# Patient Record
Sex: FEMALE | Race: WHITE | Employment: PART TIME | URBAN - METROPOLITAN AREA
[De-identification: names, ages, dates, MRNs, and addresses within clinical notes are randomized per-mention and may not be internally consistent; named-entity substitution may affect disease eponyms.]

---

## 2017-01-04 ENCOUNTER — OFFICE VISIT (OUTPATIENT)
Dept: FAMILY MEDICINE CLINIC | Age: 47
End: 2017-01-04

## 2017-01-04 VITALS
TEMPERATURE: 98.3 F | HEART RATE: 73 BPM | RESPIRATION RATE: 12 BRPM | HEIGHT: 66 IN | DIASTOLIC BLOOD PRESSURE: 86 MMHG | SYSTOLIC BLOOD PRESSURE: 126 MMHG | WEIGHT: 196 LBS | OXYGEN SATURATION: 98 % | BODY MASS INDEX: 31.5 KG/M2

## 2017-01-04 DIAGNOSIS — R05.9 COUGH: Primary | ICD-10-CM

## 2017-01-04 RX ORDER — MONTELUKAST SODIUM 10 MG/1
10 TABLET ORAL DAILY
Qty: 30 TAB | Refills: 3 | Status: SHIPPED | OUTPATIENT
Start: 2017-01-04 | End: 2017-06-05 | Stop reason: ALTCHOICE

## 2017-01-04 RX ORDER — HYDROCODONE POLISTIREX AND CHLORPHENIRAMINE POLISTIREX 10; 8 MG/5ML; MG/5ML
1 SUSPENSION, EXTENDED RELEASE ORAL
Qty: 120 ML | Refills: 0 | Status: SHIPPED | OUTPATIENT
Start: 2017-01-04 | End: 2017-03-06 | Stop reason: ALTCHOICE

## 2017-01-04 NOTE — PROGRESS NOTES
HISTORY OF PRESENT ILLNESS  Daxa Mancuso is a 55 y.o. female. HPI  Patient is here today for follow up on: Cough    Cough: developed a cough 2 weeks ago. Was given a z pack. She has continued to have this cough; cough is not really productive; No fever; no notable postnasal drip; no tobacco use. tussionex did seem to help. PMH,  Meds, Allergies, Family History, Social history reviewed        Current Outpatient Prescriptions:     montelukast (SINGULAIR) 10 mg tablet, Take 1 Tab by mouth daily. , Disp: 30 Tab, Rfl: 3    chlorpheniramine-HYDROcodone (TUSSIONEX) 10-8 mg/5 mL suspension, Take 5 mL by mouth every twelve (12) hours as needed for Cough. Max Daily Amount: 10 mL., Disp: 120 mL, Rfl: 0    zolpidem CR (AMBIEN CR) 6.25 mg tablet, Take 1 Tab by mouth nightly as needed for Sleep., Disp: 30 Tab, Rfl: 3    nortriptyline (PAMELOR) 25 mg capsule, Take 1 Cap by mouth nightly., Disp: 30 Cap, Rfl: 6    buPROPion XL (WELLBUTRIN XL) 300 mg XL tablet, Take 1 Tab by mouth every morning., Disp: 30 Tab, Rfl: 6    labetalol (NORMODYNE) 100 mg tablet, Take 1 Tab by mouth daily. , Disp: 90 Tab, Rfl: 3    FOLIC ACID PO, Take  by mouth., Disp: , Rfl:     fluticasone (FLONASE) 50 mcg/actuation nasal spray, 2 Sprays by Both Nostrils route daily. , Disp: 1 Bottle, Rfl: 3    ETANERCEPT (ENBREL SC), by SubCUTAneous route., Disp: , Rfl:     multivitamin (ONE A DAY) tablet, Take 1 Tab by mouth daily. , Disp: , Rfl:     METHOTREXATE, by Does Not Apply route. Once weekly, Disp: , Rfl:     hydroxychloroquine (PLAQUENIL) 200 mg tablet, Take 200 mg by mouth two (2) times a day., Disp: , Rfl:     butalbital-acetaminophen-caffeine (FIORICET) -40 mg per tablet, Take 1 Tab by mouth., Disp: , Rfl:        PMH,  Meds, Allergies, Family History, Social history reviewed      Review of Systems   Constitutional: Negative for fever. HENT: Positive for ear pain (some, on ocassion).         Physical Exam   Constitutional: She appears well-developed and well-nourished. No distress. HENT:   Right Ear: Tympanic membrane normal.   Left Ear: Tympanic membrane normal.   Nose: Mucosal edema present. Right sinus exhibits no maxillary sinus tenderness and no frontal sinus tenderness. Left sinus exhibits no maxillary sinus tenderness and no frontal sinus tenderness. Mouth/Throat: No oropharyngeal exudate, posterior oropharyngeal edema or posterior oropharyngeal erythema. Cardiovascular: Normal rate and regular rhythm. Exam reveals no gallop and no friction rub. No murmur heard. Pulmonary/Chest: Breath sounds normal. No respiratory distress. She has no wheezes. She has no rales. Nursing note and vitals reviewed. Visit Vitals    /86 (BP 1 Location: Left arm, BP Patient Position: Sitting)    Pulse 73    Temp 98.3 °F (36.8 °C) (Oral)    Resp 12    Ht 5' 6\" (1.676 m)    Wt 196 lb (88.9 kg)    LMP 12/16/2016 (Exact Date)    SpO2 98%    BMI 31.64 kg/m2         ASSESSMENT and PLAN    ICD-10-CM ICD-9-CM    1. Cough R05 786.2        As above, residual;  Pt advised that cough could take several weeks to clear; She does not appear to be infected at this time   treatment plan as listed below  Orders Placed This Encounter    montelukast (SINGULAIR) 10 mg tablet    chlorpheniramine-HYDROcodone (TUSSIONEX) 10-8 mg/5 mL suspension     Follow-up Disposition:  Return if symptoms worsen or fail to improve. An After Visit Summary was printed and given to the patient. This has been fully explained to the patient, who indicates understanding.

## 2017-01-04 NOTE — MR AVS SNAPSHOT
Visit Information Date & Time Provider Department Dept. Phone Encounter #  
 1/4/2017 11:20 AM Nazario Myers, [address] [id number] Your Appointments 3/6/2017 10:40 AM  
ROUTINE CARE with Nazario Myers MD  
[facility] ([facility]) Appt Note: 3m fu wellbutrin [address] [address] 2 Omar Alderson [address] [address] Upcoming Health Maintenance Date Due  
 PAP AKA CERVICAL CYTOLOGY 12/6/2019 DTaP/Tdap/Td series (2 - Td) 4/1/2022 Allergies as of 1/4/2017  Review Complete On: 1/4/2017 By: Debra Lucio LPN No Known Allergies Current Immunizations  Never Reviewed Name Date Influenza Vaccine 9/22/2016, 10/23/2013 Influenza Vaccine (Quad) PF 9/14/2015 Tdap 4/1/2012 Varicella Virus Vaccine 9/12/2014 Not reviewed this visit You Were Diagnosed With   
  
 Codes Comments Cough    -  Primary ICD-10-CM: U15 ICD-9-CM: 957. 2 Vitals BP Pulse Temp Resp Height(growth percentile) Weight(growth percentile) 126/86 (BP 1 Location: Left arm, BP Patient Position: Sitting) 73 98.3 °F (36.8 °C) (Oral) 12 5' 6\" (1.676 m) 196 lb (88.9 kg) LMP SpO2 BMI OB Status Smoking Status 12/16/2016 (Exact Date) 98% 31.64 kg/m2 Having regular periods Never Smoker BMI and BSA Data Body Mass Index Body Surface Area  
 31.64 kg/m 2 2.03 m 2 Preferred Pharmacy Pharmacy Name Phone [address] [facility] [phone] Your Updated Medication List  
  
   
This list is accurate as of: 1/4/17 12:38 PM.  Always use your most recent med list.  
  
  
  
  
 buPROPion  mg XL tablet Commonly known as:  Alon Fair Take 1 Tab by mouth every morning. chlorpheniramine-HYDROcodone 10-8 mg/5 mL suspension Commonly known as:  Misty Oro Take 5 mL by mouth every twelve (12) hours as needed for Cough. Max Daily Amount: 10 mL. ENBREL SC  
by SubCUTAneous route. FIORICET -40 mg per tablet Generic drug:  butalbital-acetaminophen-caffeine Take 1 Tab by mouth. fluticasone 50 mcg/actuation nasal spray Commonly known as:  Humberto Guild 2 Sprays by Both Nostrils route daily. FOLIC ACID PO Take  by mouth. labetalol 100 mg tablet Commonly known as:  Angela Runner Take 1 Tab by mouth daily. METHOTREXATE  
by Does Not Apply route. Once weekly  
  
 montelukast 10 mg tablet Commonly known as:  SINGULAIR Take 1 Tab by mouth daily. multivitamin tablet Commonly known as:  ONE A DAY Take 1 Tab by mouth daily. nortriptyline 25 mg capsule Commonly known as:  PAMELOR Take 1 Cap by mouth nightly. PLAQUENIL 200 mg tablet Generic drug:  hydroxychloroquine Take 200 mg by mouth two (2) times a day. zolpidem CR 6.25 mg tablet Commonly known as:  AMBIEN CR Take 1 Tab by mouth nightly as needed for Sleep. Prescriptions Printed Refills  
 chlorpheniramine-HYDROcodone (TUSSIONEX) 10-8 mg/5 mL suspension 0 Sig: Take 5 mL by mouth every twelve (12) hours as needed for Cough. Max Daily Amount: 10 mL. Class: Print Route: Oral  
  
Prescriptions Sent to Pharmacy Refills  
 montelukast (SINGULAIR) 10 mg tablet 3 Sig: Take 1 Tab by mouth daily. Class: Normal  
 Pharmacy: [facility] [address] [place] [address] #: [phone] Route: Oral  
  
Patient Instructions Cough: Care Instructions Your Care Instructions A cough is your body's response to something that bothers your throat or airways. Many things can cause a cough. You might cough because of a cold or the flu, bronchitis, or asthma. Smoking, postnasal drip, allergies, and stomach acid that backs up into your throat also can cause coughs. A cough is a symptom, not a disease. Most coughs stop when the cause, such as a cold, goes away. You can take a few steps at home to cough less and feel better. Follow-up care is a key part of your treatment and safety. Be sure to make and go to all appointments, and call your doctor if you are having problems. It's also a good idea to know your test results and keep a list of the medicines you take. How can you care for yourself at home? · Drink lots of water and other fluids. This helps thin the mucus and soothes a dry or sore throat. Honey or lemon juice in hot water or tea may ease a dry cough. · Take cough medicine as directed by your doctor. · Prop up your head on pillows to help you breathe and ease a dry cough. · Try cough drops to soothe a dry or sore throat. Cough drops don't stop a cough. Medicine-flavored cough drops are no better than candy-flavored drops or hard candy. · Do not smoke. Avoid secondhand smoke. If you need help quitting, talk to your doctor about stop-smoking programs and medicines. These can increase your chances of quitting for good. When should you call for help? Call 911 anytime you think you may need emergency care. For example, call if: 
· You have severe trouble breathing. Call your doctor now or seek immediate medical care if: 
· You cough up blood. · You have new or worse trouble breathing. · You have a new or higher fever. · You have a new rash. Watch closely for changes in your health, and be sure to contact your doctor if: 
· You cough more deeply or more often, especially if you notice more mucus or a change in the color of your mucus. · You have new symptoms, such as a sore throat, an earache, or sinus pain. · You do not get better as expected. Where can you learn more? Go to http://paco-saturnino.info/. Enter D279 in the search box to learn more about \"Cough: Care Instructions. \" Current as of: May 27, 2016 Content Version: 11.1 © 4774-5949 [employer]. Care instructions adapted under license by [employer] (which disclaims liability or warranty for this information). If you have questions about a medical condition or this instruction, always ask your healthcare professional. [address] any warranty or liability for your use of this information. Introducing [facility] & HE[facility] SERVICES! [facility] introduces [employer] patient portal. Now you can access parts of your medical record, email your doctor's office, and request medication refills online. 1. In your internet browser, go to [email or web address] 2. Click on the First Time User? Click Here link in the Sign In box. You will see the New Member Sign Up page. 3. Enter your [employer] Access Code exactly as it appears below. You will not need to use this code after youve completed the sign-up process. If you do not sign up before the expiration date, you must request a new code. · [employer] Access Code: [email or web address] Expires: 3/10/2017 10:24 PM 
 
4. Enter the last four digits of your Social Security Number (xxxx) and Date of Birth (mm/dd/yyyy) as indicated and click Submit. You will be taken to the next sign-up page. 5. Create a [employer] ID. This will be your [employer] login ID and cannot be changed, so think of one that is secure and easy to remember. 6. Create a [employer] password. You can change your password at any time. 7. Enter your Password Reset Question and Answer. This can be used at a later time if you forget your password. 8. Enter your e-mail address. You will receive e-mail notification when new information is available in [address] 9. Click Sign Up. You can now view and download portions of your medical record. 10. Click the Download Summary menu link to download a portable copy of your medical information.  
 
If you have questions, please visit the Frequently Asked Questions section of the Patient Access Solutions. Remember, [employer] is NOT to be used for urgent needs. For medical emergencies, dial 911. Now available from your iPhone and Android! Please provide this summary of care documentation to your next provider. Your primary care clinician is listed as [address]. If you have any questions after today's visit, please call [phone].

## 2017-01-04 NOTE — PROGRESS NOTES
1. Have you been to the ER, urgent care clinic since your last visit? Hospitalized since your last visit? No    2. Have you seen or consulted any other health care providers outside of the [address] [place] [address] since your last visit? Include any pap smears or colon screening.  No

## 2017-01-04 NOTE — PATIENT INSTRUCTIONS
Cough: Care Instructions  Your Care Instructions  A cough is your body's response to something that bothers your throat or airways. Many things can cause a cough. You might cough because of a cold or the flu, bronchitis, or asthma. Smoking, postnasal drip, allergies, and stomach acid that backs up into your throat also can cause coughs. A cough is a symptom, not a disease. Most coughs stop when the cause, such as a cold, goes away. You can take a few steps at home to cough less and feel better. Follow-up care is a key part of your treatment and safety. Be sure to make and go to all appointments, and call your doctor if you are having problems. It's also a good idea to know your test results and keep a list of the medicines you take. How can you care for yourself at home? · Drink lots of water and other fluids. This helps thin the mucus and soothes a dry or sore throat. Honey or lemon juice in hot water or tea may ease a dry cough. · Take cough medicine as directed by your doctor. · Prop up your head on pillows to help you breathe and ease a dry cough. · Try cough drops to soothe a dry or sore throat. Cough drops don't stop a cough. Medicine-flavored cough drops are no better than candy-flavored drops or hard candy. · Do not smoke. Avoid secondhand smoke. If you need help quitting, talk to your doctor about stop-smoking programs and medicines. These can increase your chances of quitting for good. When should you call for help? Call 911 anytime you think you may need emergency care. For example, call if:  · You have severe trouble breathing. Call your doctor now or seek immediate medical care if:  · You cough up blood. · You have new or worse trouble breathing. · You have a new or higher fever. · You have a new rash.   Watch closely for changes in your health, and be sure to contact your doctor if:  · You cough more deeply or more often, especially if you notice more mucus or a change in the color of your mucus. · You have new symptoms, such as a sore throat, an earache, or sinus pain. · You do not get better as expected. Where can you learn more? Go to http://paco-saturnino.info/. Enter D279 in the search box to learn more about \"Cough: Care Instructions. \"  Current as of: May 27, 2016  Content Version: 11.1  © 9468-8074 [employer]. Care instructions adapted under license by [employer] (which disclaims liability or warranty for this information). If you have questions about a medical condition or this instruction, always ask your healthcare professional. [address] any warranty or liability for your use of this information.

## 2017-01-22 RX ORDER — FLUCONAZOLE 150 MG/1
150 TABLET ORAL DAILY
Qty: 1 TAB | Refills: 0 | Status: SHIPPED | OUTPATIENT
Start: 2017-01-22 | End: 2017-01-23

## 2017-01-23 ENCOUNTER — TELEPHONE (OUTPATIENT)
Dept: FAMILY MEDICINE CLINIC | Age: 47
End: 2017-01-23

## 2017-01-23 NOTE — TELEPHONE ENCOUNTER
Spoke with patient to inform that yeast showed on pap and that Dr. Rosalia Cooley sent medication Diflucan to pharmacy. Patient verbalized understanding, stated that Dr. Rosalia Cooley already sent order for Diflucan in December which patient stated taking medication.

## 2017-03-06 ENCOUNTER — OFFICE VISIT (OUTPATIENT)
Dept: FAMILY MEDICINE CLINIC | Age: 47
End: 2017-03-06

## 2017-03-06 VITALS
TEMPERATURE: 98.2 F | RESPIRATION RATE: 16 BRPM | SYSTOLIC BLOOD PRESSURE: 136 MMHG | HEIGHT: 66 IN | HEART RATE: 71 BPM | WEIGHT: 180 LBS | DIASTOLIC BLOOD PRESSURE: 88 MMHG | OXYGEN SATURATION: 98 % | BODY MASS INDEX: 28.93 KG/M2

## 2017-03-06 DIAGNOSIS — F51.01 PRIMARY INSOMNIA: ICD-10-CM

## 2017-03-06 DIAGNOSIS — E78.00 HYPERCHOLESTEREMIA: ICD-10-CM

## 2017-03-06 DIAGNOSIS — F32.9 REACTIVE DEPRESSION: Primary | ICD-10-CM

## 2017-03-06 RX ORDER — ZOLPIDEM TARTRATE 6.25 MG/1
6.25 TABLET, FILM COATED, EXTENDED RELEASE ORAL
Qty: 30 TAB | Refills: 3 | Status: SHIPPED | OUTPATIENT
Start: 2017-03-06 | End: 2017-06-05 | Stop reason: SDUPTHER

## 2017-03-06 NOTE — PATIENT INSTRUCTIONS
Recovering From Depression: Care Instructions  Your Care Instructions  Taking good care of yourself is important as you recover from depression. In time, your symptoms will fade as your treatment takes hold. Do not give up. Instead, focus your energy on getting better. Your mood will improve. It just takes some time. Focus on things that can help you feel better, such as being with friends and family, eating well, and getting enough rest. But take things slowly. Do not do too much too soon. You will begin to feel better gradually. Follow-up care is a key part of your treatment and safety. Be sure to make and go to all appointments, and call your doctor if you are having problems. It's also a good idea to know your test results and keep a list of the medicines you take. How can you care for yourself at home? Be realistic  · If you have a large task to do, break it up into smaller steps you can handle, and just do what you can. · You may want to put off important decisions until your depression has lifted. If you have plans that will have a major impact on your life, such as marriage, divorce, or a job change, try to wait a bit. Talk it over with friends and loved ones who can help you look at the overall picture first.  · Reaching out to people for help is important. Do not isolate yourself. Let your family and friends help you. Find someone you can trust and confide in, and talk to that person. · Be patient, and be kind to yourself. Remember that depression is not your fault and is not something you can overcome with willpower alone. Treatment is necessary for depression, just like for any other illness. Feeling better takes time, and your mood will improve little by little. Stay active  · Stay busy and get outside. Take a walk, or try some other light exercise. · Talk with your doctor about an exercise program. Exercise can help with mild depression. · Go to a movie or concert.  Take part in a [employer] activity or other social gathering. Go to a [employer] game. · Ask a friend to have dinner with you. Take care of yourself  · Eat a balanced diet with plenty of fresh fruits and vegetables, whole grains, and lean protein. If you have lost your appetite, eat small snacks rather than large meals. · Avoid drinking alcohol or using illegal drugs. Do not take medicines that have not been prescribed for you. They may interfere with medicines you may be taking for depression, or they may make your depression worse. · Take your medicines exactly as they are prescribed. You may start to feel better within 1 to 3 weeks of taking antidepressant medicine. But it can take as many as 6 to 8 weeks to see more improvement. If you have questions or concerns about your medicines, or if you do not notice any improvement by 3 weeks, talk to your doctor. · If you have any side effects from your medicine, tell your doctor. Antidepressants can make you feel tired, dizzy, or nervous. Some people have dry mouth, constipation, headaches, sexual problems, or diarrhea. Many of these side effects are mild and will go away on their own after you have been taking the medicine for a few weeks. Some may last longer. Talk to your doctor if side effects are bothering you too much. You might be able to try a different medicine. · Get enough sleep. If you have problems sleeping:  ¨ Go to bed at the same time every night, and get up at the same time every morning. ¨ Keep your bedroom dark and quiet. ¨ Do not exercise after 5:00 p.m. ¨ Avoid drinks with caffeine after 5:00 p.m. · Avoid sleeping pills unless they are prescribed by the doctor treating your depression. Sleeping pills may make you groggy during the day, and they may interact with other medicine you are taking. · If you have any other illnesses, such as diabetes, heart disease, or high blood pressure, make sure to continue with your treatment.  Tell your doctor about all of the medicines you take, including those with or without a prescription. · Keep the numbers for these national suicide hotlines: 3-201-012-TALK (0-918.520.2675) and 7-593-KAISDHW (3-562.756.5431). If you or someone you know talks about suicide or feeling hopeless, get help right away. When should you call for help? Call 911 anytime you think you may need emergency care. For example, call if:  · You feel like hurting yourself or someone else. · Someone you know has depression and is about to attempt or is attempting suicide. Call your doctor now or seek immediate medical care if:  · You hear voices. · Someone you know has depression and:  ¨ Starts to give away his or her possessions. ¨ Uses illegal drugs or drinks alcohol heavily. ¨ Talks or writes about death, including writing suicide notes or talking about guns, knives, or pills. ¨ Starts to spend a lot of time alone. ¨ Acts very aggressively or suddenly appears calm. Watch closely for changes in your health, and be sure to contact your doctor if:  · You do not get better as expected. Where can you learn more? Go to http://paco-saturnino.info/. Enter D367 in the search box to learn more about \"Recovering From Depression: Care Instructions. \"  Current as of: July 26, 2016  Content Version: 11.1  © 4335-7720 [employer]. Care instructions adapted under license by [employer] (which disclaims liability or warranty for this information). If you have questions about a medical condition or this instruction, always ask your healthcare professional. [place] [address] any warranty or liability for your use of this information. DASH Diet: Care Instructions  Your Care Instructions  The DASH diet is an eating plan that can help lower your blood pressure. DASH stands for Dietary Approaches to Stop Hypertension. Hypertension is high blood pressure.   The DASH diet focuses on eating foods that are high in calcium, potassium, and magnesium. These nutrients can lower blood pressure. The foods that are highest in these nutrients are fruits, vegetables, low-fat dairy products, nuts, seeds, and legumes. But taking calcium, potassium, and magnesium supplements instead of eating foods that are high in those nutrients does not have the same effect. The DASH diet also includes whole grains, fish, and poultry. The DASH diet is one of several lifestyle changes your doctor may recommend to lower your high blood pressure. Your doctor may also want you to decrease the amount of sodium in your diet. Lowering sodium while following the DASH diet can lower blood pressure even further than just the DASH diet alone. Follow-up care is a key part of your treatment and safety. Be sure to make and go to all appointments, and call your doctor if you are having problems. It's also a good idea to know your test results and keep a list of the medicines you take. How can you care for yourself at home? Following the DASH diet  · Eat 4 to 5 servings of fruit each day. A serving is 1 medium-sized piece of fruit, ½ cup chopped or canned fruit, 1/4 cup dried fruit, or 4 ounces (½ cup) of fruit juice. Choose fruit more often than fruit juice. · Eat 4 to 5 servings of vegetables each day. A serving is 1 cup of lettuce or raw leafy vegetables, ½ cup of chopped or cooked vegetables, or 4 ounces (½ cup) of vegetable juice. Choose vegetables more often than vegetable juice. · Get 2 to 3 servings of low-fat and fat-free dairy each day. A serving is 8 ounces of milk, 1 cup of yogurt, or 1 ½ ounces of cheese. · Eat 6 to 8 servings of grains each day. A serving is 1 slice of bread, 1 ounce of dry cereal, or ½ cup of cooked rice, pasta, or cooked cereal. Try to choose whole-grain products as much as possible. · Limit lean meat, poultry, and fish to 2 servings each day. A serving is 3 ounces, about the size of a deck of cards.   · Eat 4 to 5 servings of nuts, seeds, and legumes (cooked dried beans, lentils, and split peas) each week. A serving is 1/3 cup of nuts, 2 tablespoons of seeds, or ½ cup of cooked beans or peas. · Limit fats and oils to 2 to 3 servings each day. A serving is 1 teaspoon of vegetable oil or 2 tablespoons of salad dressing. · Limit sweets and added sugars to 5 servings or less a week. A serving is 1 tablespoon jelly or jam, ½ cup sorbet, or 1 cup of lemonade. · Eat less than 2,300 milligrams (mg) of sodium a day. If you limit your sodium to 1,500 mg a day, you can lower your blood pressure even more. Tips for success  · Start small. Do not try to make dramatic changes to your diet all at once. You might feel that you are missing out on your favorite foods and then be more likely to not follow the plan. Make small changes, and stick with them. Once those changes become habit, add a few more changes. · Try some of the following:  ¨ Make it a goal to eat a fruit or vegetable at every meal and at snacks. This will make it easy to get the recommended amount of fruits and vegetables each day. ¨ Try yogurt topped with fruit and nuts for a snack or healthy dessert. ¨ Add lettuce, tomato, cucumber, and onion to sandwiches. ¨ Combine a ready-made pizza crust with low-fat mozzarella cheese and lots of vegetable toppings. Try using tomatoes, squash, spinach, broccoli, carrots, cauliflower, and onions. ¨ Have a variety of cut-up vegetables with a low-fat dip as an appetizer instead of chips and dip. ¨ Sprinkle sunflower seeds or chopped almonds over salads. Or try adding chopped walnuts or almonds to cooked vegetables. ¨ Try some vegetarian meals using beans and peas. Add garbanzo or kidney beans to salads. Make burritos and tacos with mashed elkins beans or black beans. Where can you learn more? Go to http://paco-saturnino.info/. Enter Q919 in the search box to learn more about \"DASH Diet: Care Instructions. \"  Current as of: March 23, 2016  Content Version: 11.1  © 5818-5816 [employer]. Care instructions adapted under license by [employer] (which disclaims liability or warranty for this information). If you have questions about a medical condition or this instruction, always ask your healthcare professional. [address] any warranty or liability for your use of this information.

## 2017-03-06 NOTE — MR AVS SNAPSHOT
Visit Information Date & Time Provider Department Dept. Phone Encounter #  
 3/6/2017 10:40 AM Yvette Macdonald  [facility] [id number] Follow-up Instructions Return in about 3 months (around 6/6/2017) for mood/chol. Upcoming Health Maintenance Date Due  
 PAP AKA CERVICAL CYTOLOGY 12/6/2019 DTaP/Tdap/Td series (2 - Td) 4/1/2022 Allergies as of 3/6/2017  Review Complete On: 3/6/2017 By: Yvette Macdonald MD  
 No Known Allergies Current Immunizations  Never Reviewed Name Date Influenza Vaccine 9/22/2016, 10/23/2013 Influenza Vaccine (Quad) PF 9/14/2015 Tdap 4/1/2012 Varicella Virus Vaccine 9/12/2014 Not reviewed this visit You Were Diagnosed With   
  
 Codes Comments Reactive depression    -  Primary ICD-10-CM: F32.9 ICD-9-CM: 300.4 Hypercholesteremia     ICD-10-CM: E78.00 ICD-9-CM: 272.0 Primary insomnia     ICD-10-CM: F51.01 
ICD-9-CM: 307.42 Vitals BP Pulse Temp Resp Height(growth percentile) Weight(growth percentile) 136/88 (BP 1 Location: Left arm, BP Patient Position: Sitting) 71 98.2 °F (36.8 °C) (Oral) 16 5' 6\" (1.676 m) 180 lb (81.6 kg) LMP SpO2 BMI OB Status Smoking Status 12/16/2016 98% 29.05 kg/m2 Having regular periods Never Smoker BMI and BSA Data Body Mass Index Body Surface Area 29.05 kg/m 2 1.95 m 2 Preferred Pharmacy Pharmacy Name Phone Madelyn Hernandez ,[address] [facility] [phone] Your Updated Medication List  
  
   
This list is accurate as of: 3/6/17 11:13 AM.  Always use your most recent med list.  
  
  
  
  
 buPROPion  mg XL tablet Commonly known as:  Reji Spray Take 1 Tab by mouth every morning. ENBREL SC  
by SubCUTAneous route. FIORICET -40 mg per tablet Generic drug:  butalbital-acetaminophen-caffeine Take 1 Tab by mouth. fluticasone 50 mcg/actuation nasal spray Commonly known as:  William Polk 2 Sprays by Both Nostrils route daily. FOLIC ACID PO Take  by mouth. labetalol 100 mg tablet Commonly known as:  Meliza Colander Take 1 Tab by mouth daily. METHOTREXATE  
by Does Not Apply route. Once weekly  
  
 montelukast 10 mg tablet Commonly known as:  SINGULAIR Take 1 Tab by mouth daily. multivitamin tablet Commonly known as:  ONE A DAY Take 1 Tab by mouth daily. nortriptyline 25 mg capsule Commonly known as:  PAMELOR Take 1 Cap by mouth nightly. PLAQUENIL 200 mg tablet Generic drug:  hydroxychloroquine Take 200 mg by mouth two (2) times a day. zolpidem CR 6.25 mg tablet Commonly known as:  AMBIEN CR Take 1 Tab by mouth nightly as needed for Sleep. Follow-up Instructions Return in about 3 months (around 6/6/2017) for mood/chol. Patient Instructions Recovering From Depression: Care Instructions Your Care Instructions Taking good care of yourself is important as you recover from depression. In time, your symptoms will fade as your treatment takes hold. Do not give up. Instead, focus your energy on getting better. Your mood will improve. It just takes some time. Focus on things that can help you feel better, such as being with friends and family, eating well, and getting enough rest. But take things slowly. Do not do too much too soon. You will begin to feel better gradually. Follow-up care is a key part of your treatment and safety. Be sure to make and go to all appointments, and call your doctor if you are having problems. It's also a good idea to know your test results and keep a list of the medicines you take. How can you care for yourself at home? Be realistic · If you have a large task to do, break it up into smaller steps you can handle, and just do what you can. · You may want to put off important decisions until your depression has lifted. If you have plans that will have a major impact on your life, such as marriage, divorce, or a job change, try to wait a bit. Talk it over with friends and loved ones who can help you look at the overall picture first. 
· Reaching out to people for help is important. Do not isolate yourself. Let your family and friends help you. Find someone you can trust and confide in, and talk to that person. · Be patient, and be kind to yourself. Remember that depression is not your fault and is not something you can overcome with willpower alone. Treatment is necessary for depression, just like for any other illness. Feeling better takes time, and your mood will improve little by little. Stay active · Stay busy and get outside. Take a walk, or try some other light exercise. · Talk with your doctor about an exercise program. Exercise can help with mild depression. · Go to a movie or concert. Take part in a Restorationist activity or other social gathering. Go to a ball game. · Ask a friend to have dinner with you. Take care of yourself · Eat a balanced diet with plenty of fresh fruits and vegetables, whole grains, and lean protein. If you have lost your appetite, eat small snacks rather than large meals. · Avoid drinking alcohol or using illegal drugs. Do not take medicines that have not been prescribed for you. They may interfere with medicines you may be taking for depression, or they may make your depression worse. · Take your medicines exactly as they are prescribed. You may start to feel better within 1 to 3 weeks of taking antidepressant medicine. But it can take as many as 6 to 8 weeks to see more improvement. If you have questions or concerns about your medicines, or if you do not notice any improvement by 3 weeks, talk to your doctor. · If you have any side effects from your medicine, tell your doctor. Antidepressants can make you feel tired, dizzy, or nervous. Some people have dry mouth, constipation, headaches, sexual problems, or diarrhea. Many of these side effects are mild and will go away on their own after you have been taking the medicine for a few weeks. Some may last longer. Talk to your doctor if side effects are bothering you too much. You might be able to try a different medicine. · Get enough sleep. If you have problems sleeping: ¨ Go to bed at the same time every night, and get up at the same time every morning. ¨ Keep your bedroom dark and quiet. ¨ Do not exercise after 5:00 p.m. ¨ Avoid drinks with caffeine after 5:00 p.m. · Avoid sleeping pills unless they are prescribed by the doctor treating your depression. Sleeping pills may make you groggy during the day, and they may interact with other medicine you are taking. · If you have any other illnesses, such as diabetes, heart disease, or high blood pressure, make sure to continue with your treatment. Tell your doctor about all of the medicines you take, including those with or without a prescription. · Keep the numbers for these national suicide hotlines: 7-246-245-TALK (0-564.112.9962) and 2-977-DUSEWEB (4-516.944.6305). If you or someone you know talks about suicide or feeling hopeless, get help right away. When should you call for help? Call 911 anytime you think you may need emergency care. For example, call if: 
· You feel like hurting yourself or someone else. · Someone you know has depression and is about to attempt or is attempting suicide. Call your doctor now or seek immediate medical care if: 
· You hear voices. · Someone you know has depression and: 
¨ Starts to give away his or her possessions. ¨ Uses illegal drugs or drinks alcohol heavily. ¨ Talks or writes about death, including writing suicide notes or talking about guns, knives, or pills. ¨ Starts to spend a lot of time alone. ¨ Acts very aggressively or suddenly appears calm. Watch closely for changes in your health, and be sure to contact your doctor if: 
· You do not get better as expected. Where can you learn more? Go to http://paco-saturnino.info/. Enter E741 in the search box to learn more about \"Recovering From Depression: Care Instructions. \" Current as of: July 26, 2016 Content Version: 11.1 © 0193-9354 [employer]. Care instructions adapted under license by [employer] (which disclaims liability or warranty for this information). If you have questions about a medical condition or this instruction, always ask your healthcare professional. [place] [address] any warranty or liability for your use of this information. DASH Diet: Care Instructions Your Care Instructions The DASH diet is an eating plan that can help lower your blood pressure. DASH stands for Dietary Approaches to Stop Hypertension. Hypertension is high blood pressure. The DASH diet focuses on eating foods that are high in calcium, potassium, and magnesium. These nutrients can lower blood pressure. The foods that are highest in these nutrients are fruits, vegetables, low-fat dairy products, nuts, seeds, and legumes. But taking calcium, potassium, and magnesium supplements instead of eating foods that are high in those nutrients does not have the same effect. The DASH diet also includes whole grains, fish, and poultry. The DASH diet is one of several lifestyle changes your doctor may recommend to lower your high blood pressure. Your doctor may also want you to decrease the amount of sodium in your diet. Lowering sodium while following the DASH diet can lower blood pressure even further than just the DASH diet alone. Follow-up care is a key part of your treatment and safety.  Be sure to make and go to all appointments, and call your doctor if you are having problems. It's also a good idea to know your test results and keep a list of the medicines you take. How can you care for yourself at home? Following the DASH diet · Eat 4 to 5 servings of fruit each day. A serving is 1 medium-sized piece of fruit, ½ cup chopped or canned fruit, 1/4 cup dried fruit, or 4 ounces (½ cup) of fruit juice. Choose fruit more often than fruit juice. · Eat 4 to 5 servings of vegetables each day. A serving is 1 cup of lettuce or raw leafy vegetables, ½ cup of chopped or cooked vegetables, or 4 ounces (½ cup) of vegetable juice. Choose vegetables more often than vegetable juice. · Get 2 to 3 servings of low-fat and fat-free dairy each day. A serving is 8 ounces of milk, 1 cup of yogurt, or 1 ½ ounces of cheese. · Eat 6 to 8 servings of grains each day. A serving is 1 slice of bread, 1 ounce of dry cereal, or ½ cup of cooked rice, pasta, or cooked cereal. Try to choose whole-grain products as much as possible. · Limit lean meat, poultry, and fish to 2 servings each day. A serving is 3 ounces, about the size of a deck of cards. · Eat 4 to 5 servings of nuts, seeds, and legumes (cooked dried beans, lentils, and split peas) each week. A serving is 1/3 cup of nuts, 2 tablespoons of seeds, or ½ cup of cooked beans or peas. · Limit fats and oils to 2 to 3 servings each day. A serving is 1 teaspoon of vegetable oil or 2 tablespoons of salad dressing. · Limit sweets and added sugars to 5 servings or less a week. A serving is 1 tablespoon jelly or jam, ½ cup sorbet, or 1 cup of lemonade. · Eat less than 2,300 milligrams (mg) of sodium a day. If you limit your sodium to 1,500 mg a day, you can lower your blood pressure even more. Tips for success · Start small. Do not try to make dramatic changes to your diet all at once. You might feel that you are missing out on your favorite foods and then be more likely to not follow the plan.  Make small changes, and stick with them. Once those changes become habit, add a few more changes. · Try some of the following: ¨ Make it a goal to eat a fruit or vegetable at every meal and at snacks. This will make it easy to get the recommended amount of fruits and vegetables each day. ¨ Try yogurt topped with fruit and nuts for a snack or healthy dessert. ¨ Add lettuce, tomato, cucumber, and onion to sandwiches. ¨ Combine a ready-made pizza crust with low-fat mozzarella cheese and lots of vegetable toppings. Try using tomatoes, squash, spinach, broccoli, carrots, cauliflower, and onions. ¨ Have a variety of cut-up vegetables with a low-fat dip as an appetizer instead of chips and dip. ¨ Sprinkle sunflower seeds or chopped almonds over salads. Or try adding chopped walnuts or almonds to cooked vegetables. ¨ Try some vegetarian meals using beans and peas. Add garbanzo or kidney beans to salads. Make burritos and tacos with mashed elkins beans or black beans. Where can you learn more? Go to http://paco-saturnino.info/. Enter S629 in the search box to learn more about \"DASH Diet: Care Instructions. \" Current as of: March 23, 2016 Content Version: 11.1 © 6433-6543 [employer]. Care instructions adapted under license by [employer] (which disclaims liability or warranty for this information). If you have questions about a medical condition or this instruction, always ask your healthcare professional. [place] [address] any warranty or liability for your use of this information. Introducing [facility]! [employer] introduces [employer] (by is[employer]ankur) patient portal. Now you can access parts of your medical record, email your doctor's office, and request medication refills online. 1. In your internet browser, go to [email or web address] 2. Click on the First Time User? Click Here link in the Sign In box. You will see the New Member Sign Up page. 3. Enter your [employer] Access Code exactly as it appears below. You will not need to use this code after youve completed the sign-up process. If you do not sign up before the expiration date, you must request a new code. · [employer] Access Code: [email or web address] Expires: 3/10/2017 10:24 PM 
 
4. Enter the last four digits of your Social Security Number (xxxx) and Date of Birth (mm/dd/yyyy) as indicated and click Submit. You will be taken to the next sign-up page. 5. Create a Emotivet ID. This will be your [employer] login ID and cannot be changed, so think of one that is secure and easy to remember. 6. Create a [employer] password. You can change your password at any time. 7. Enter your Password Reset Question and Answer. This can be used at a later time if you forget your password. 8. Enter your e-mail address. You will receive e-mail notification when new information is available in [address]. 9. Click Sign Up. You can now view and download portions of your medical record. 10. Click the Download Summary menu link to download a portable copy of your medical information. If you have questions, please visit the Frequently Asked Questions section of the [employer] website. Remember, [employer] is NOT to be used for urgent needs. For medical emergencies, dial 911. Now available from your iPhone and Android! Please provide this summary of care documentation to your next provider. Your primary care clinician is listed as [address]. If you have any questions after today's visit, please call [phone].

## 2017-03-06 NOTE — PROGRESS NOTES
HISTORY OF PRESENT ILLNESS  Sam Corrigan is a 55 y.o. female. HPI  Patient is here today for evaluation and treatment of: Depression    Depression: Pt states that depression is controlled. She wants to consider med decrease though she has been on meds only for 3 months. Her depression score is as noted. She does have some difficulty with insomnia but she uses Christine Bosworth for this and this helps; She has changed her diet and she exercises regularly. Needs a refill on ambien    Pt noted to have hypercholesterolemia; she has lost about 30 pounds since her last visit. She is exercising regularly and has changed her diet,          Current Outpatient Prescriptions:     zolpidem CR (AMBIEN CR) 6.25 mg tablet, Take 1 Tab by mouth nightly as needed for Sleep., Disp: 30 Tab, Rfl: 3    montelukast (SINGULAIR) 10 mg tablet, Take 1 Tab by mouth daily. , Disp: 30 Tab, Rfl: 3    nortriptyline (PAMELOR) 25 mg capsule, Take 1 Cap by mouth nightly., Disp: 30 Cap, Rfl: 6    buPROPion XL (WELLBUTRIN XL) 300 mg XL tablet, Take 1 Tab by mouth every morning., Disp: 30 Tab, Rfl: 6    labetalol (NORMODYNE) 100 mg tablet, Take 1 Tab by mouth daily. , Disp: 90 Tab, Rfl: 3    FOLIC ACID PO, Take  by mouth., Disp: , Rfl:     fluticasone (FLONASE) 50 mcg/actuation nasal spray, 2 Sprays by Both Nostrils route daily. , Disp: 1 Bottle, Rfl: 3    multivitamin (ONE A DAY) tablet, Take 1 Tab by mouth daily. , Disp: , Rfl:     METHOTREXATE, by Does Not Apply route. Once weekly, Disp: , Rfl:     hydroxychloroquine (PLAQUENIL) 200 mg tablet, Take 200 mg by mouth two (2) times a day., Disp: , Rfl:     butalbital-acetaminophen-caffeine (FIORICET) -40 mg per tablet, Take 1 Tab by mouth., Disp: , Rfl:       PMH,  Meds, Allergies, Family History, Social history reviewed    Review of Systems   Constitutional: Negative for chills and fever. Cardiovascular: Negative for chest pain and palpitations.        Physical Exam   Constitutional: She appears well-developed and well-nourished. No distress. Neck: Neck supple. No thyromegaly present. Cardiovascular: Normal rate and regular rhythm. Exam reveals no gallop and no friction rub. No murmur heard. Pulmonary/Chest: Breath sounds normal. No respiratory distress. She has no wheezes. She has no rales. Musculoskeletal: She exhibits no edema. Nursing note and vitals reviewed. Visit Vitals    /88 (BP 1 Location: Left arm, BP Patient Position: Sitting)    Pulse 71    Temp 98.2 °F (36.8 °C) (Oral)    Resp 16    Ht 5' 6\" (1.676 m)    Wt 180 lb (81.6 kg)    LMP 12/16/2016    SpO2 98%    BMI 29.05 kg/m2     Lab Results   Component Value Date/Time    Cholesterol, total 227 12/06/2016 10:57 AM    HDL Cholesterol 71 12/06/2016 10:57 AM    LDL, calculated 116.8 12/06/2016 10:57 AM    VLDL, calculated 39.2 12/06/2016 10:57 AM    Triglyceride 196 12/06/2016 10:57 AM    CHOL/HDL Ratio 3.2 12/06/2016 10:57 AM     Lab Results   Component Value Date/Time    Glucose 87 12/06/2016 10:57 AM    Glucose (POC) 77 04/18/2012 07:09 PM     Lab Results   Component Value Date/Time    Sodium 138 12/06/2016 10:57 AM    Potassium 4.4 12/06/2016 10:57 AM    Chloride 104 12/06/2016 10:57 AM    CO2 24 12/06/2016 10:57 AM    Anion gap 10 12/06/2016 10:57 AM    Glucose 87 12/06/2016 10:57 AM    BUN 11 12/06/2016 10:57 AM    Creatinine 0.80 12/06/2016 10:57 AM    BUN/Creatinine ratio 14 12/06/2016 10:57 AM    GFR est AA >60 12/06/2016 10:57 AM    GFR est non-AA >60 12/06/2016 10:57 AM    Calcium 9.6 12/06/2016 10:57 AM         ASSESSMENT and PLAN    ICD-10-CM ICD-9-CM    1. Reactive depression- improved F32.9 300.4    2. Hypercholesteremia- elevated E78.00 272.0    3.  Primary insomnia F51.01 307.42 zolpidem CR (AMBIEN CR) 6.25 mg tablet       As above,   above all stable unless otherwise noted  Labs at next visit  Continue current meds as ordered  Refilled Homero Sahu  Will plan to consider wean on wellbutrin if pt is still stable at follow up  Follow-up Disposition:  Return in about 3 months (around 6/6/2017) for mood/chol. An After Visit Summary was printed and given to the patient. This has been fully explained to the patient, who indicates understanding.

## 2017-03-06 NOTE — PROGRESS NOTES
1. Have you been to the ER, urgent care clinic since your last visit? Hospitalized since your last visit? No    2. Have you seen or consulted any other health care providers outside of the [facility] since your last visit? Include any pap smears or colon screening.  Yes Where: Dr. Candace Norman - rheumatology

## 2017-04-17 ENCOUNTER — TELEPHONE (OUTPATIENT)
Dept: FAMILY MEDICINE CLINIC | Age: 47
End: 2017-04-17

## 2017-05-02 ENCOUNTER — OFFICE VISIT (OUTPATIENT)
Dept: FAMILY MEDICINE CLINIC | Facility: CLINIC | Age: 47
End: 2017-05-02

## 2017-05-02 VITALS
SYSTOLIC BLOOD PRESSURE: 138 MMHG | OXYGEN SATURATION: 94 % | BODY MASS INDEX: 28.28 KG/M2 | DIASTOLIC BLOOD PRESSURE: 77 MMHG | WEIGHT: 176 LBS | TEMPERATURE: 98.1 F | RESPIRATION RATE: 16 BRPM | HEART RATE: 68 BPM | HEIGHT: 66 IN

## 2017-05-02 DIAGNOSIS — J06.9 VIRAL UPPER RESPIRATORY TRACT INFECTION: Primary | ICD-10-CM

## 2017-05-02 DIAGNOSIS — J30.9 ALLERGIC RHINITIS, UNSPECIFIED ALLERGIC RHINITIS TRIGGER, UNSPECIFIED RHINITIS SEASONALITY: ICD-10-CM

## 2017-05-02 RX ORDER — HYDROCODONE POLISTIREX AND CHLORPHENIRAMINE POLISTIREX 10; 8 MG/5ML; MG/5ML
5 SUSPENSION, EXTENDED RELEASE ORAL
Qty: 120 ML | Refills: 0 | Status: SHIPPED | OUTPATIENT
Start: 2017-05-02 | End: 2017-06-05 | Stop reason: ALTCHOICE

## 2017-05-02 RX ORDER — MELOXICAM 7.5 MG/1
TABLET ORAL
COMMUNITY
Start: 2017-04-10

## 2017-05-02 RX ORDER — PROMETHAZINE HYDROCHLORIDE 25 MG/1
TABLET ORAL
COMMUNITY
Start: 2017-04-09 | End: 2017-12-11 | Stop reason: SDUPTHER

## 2017-05-02 NOTE — PROGRESS NOTES
HISTORY OF PRESENT ILLNESS  Doug Vargas is a 55 y.o. female. HPI Comments: Acute care visit with c/o sore throat and cough x 1 day. She denies any fever or chills. She reports her cough is productive. She notes some seasonal allergies. She is not taking her Singulair due to cost of medication. She tells me that she has some OTC claritin but she has not taken this either. She has tried some warm tea at home. Cough   The history is provided by the patient. This is a new problem. The current episode started yesterday. The problem has not changed since onset. Pertinent negatives include no shortness of breath. She has tried nothing for the symptoms. Sore Throat    The history is provided by the patient. This is a new problem. The current episode started yesterday. The problem has not changed since onset. There has been no fever. Associated symptoms include cough. Pertinent negatives include no shortness of breath. She has had no exposure to strep or mono. Treatments tried: warm tea. The treatment provided no relief. Review of Systems   Constitutional: Negative. HENT: Positive for sore throat. Respiratory: Positive for cough and sputum production. Negative for shortness of breath and wheezing. Cardiovascular: Negative. Genitourinary: Negative. Neurological: Negative. Past Medical History:   Diagnosis Date    Insomnia     Migraine     Screening for cervical cancer 4/6/2010     Past Surgical History:   Procedure Laterality Date    HX APPENDECTOMY  7/1/09    HX CHOLECYSTECTOMY  2001     Current Outpatient Prescriptions on File Prior to Visit   Medication Sig Dispense Refill    zolpidem CR (AMBIEN CR) 6.25 mg tablet Take 1 Tab by mouth nightly as needed for Sleep. 30 Tab 3    montelukast (SINGULAIR) 10 mg tablet Take 1 Tab by mouth daily. 30 Tab 3    nortriptyline (PAMELOR) 25 mg capsule Take 1 Cap by mouth nightly.  30 Cap 6    buPROPion XL (WELLBUTRIN XL) 300 mg XL tablet Take 1 Tab by mouth every morning. 30 Tab 6    labetalol (NORMODYNE) 100 mg tablet Take 1 Tab by mouth daily. 90 Tab 3    FOLIC ACID PO Take  by mouth.  fluticasone (FLONASE) 50 mcg/actuation nasal spray 2 Sprays by Both Nostrils route daily. 1 Bottle 3    multivitamin (ONE A DAY) tablet Take 1 Tab by mouth daily.  METHOTREXATE by Does Not Apply route. Once weekly      hydroxychloroquine (PLAQUENIL) 200 mg tablet Take 200 mg by mouth two (2) times a day.  butalbital-acetaminophen-caffeine (FIORICET) -40 mg per tablet Take 1 Tab by mouth. No current facility-administered medications on file prior to visit. Allergies and Intolerances:   No Known Allergies    Family History:   Family History   Problem Relation Age of Onset    Diabetes Mother     Lung Disease Mother      COPD    Alcohol abuse Father     Crohn's Disease Maternal Grandmother        Social History:   She  reports that she has never smoked. She has never used smokeless tobacco. She  reports that she drinks alcohol. Vitals:   Visit Vitals    /77    Pulse 68    Temp 98.1 °F (36.7 °C)    Resp 16    Ht 5' 6\" (1.676 m)    Wt 176 lb (79.8 kg)    LMP 04/10/2017    SpO2 94%    BMI 28.41 kg/m2     Body surface area is 1.93 meters squared. Physical Exam   Constitutional: She is oriented to person, place, and time. She appears well-developed and well-nourished. HENT:   Head: Atraumatic. Right Ear: External ear normal.   Left Ear: External ear normal.   Nose: Mucosal edema present. Mouth/Throat: Oropharynx is clear and moist.   Cardiovascular: Normal rate. Pulmonary/Chest: Effort normal and breath sounds normal. She has no wheezes. Neurological: She is alert and oriented to person, place, and time. Psychiatric: She has a normal mood and affect. Her behavior is normal.   Nursing note and vitals reviewed.       ASSESSMENT and PLAN    ICD-10-CM ICD-9-CM    1. Viral upper respiratory tract infection J06.9 465.9 chlorpheniramine-HYDROcodone (TUSSIONEX) 10-8 mg/5 mL suspension    B97.89     2. Allergic rhinitis, unspecified allergic rhinitis trigger, unspecified rhinitis seasonality J30.9 477.9      Follow-up Disposition:  Return if symptoms worsen or fail to improve. reviewed medications and side effects in detail  Continue OTC claritin and Flonase. May use warm salt water gargle, cough drop or throat spray. - Alarm signals discussed. ER precautions  - Plan of care reviewed with patient. Understanding verbalized and they are in agreement with plan of care.

## 2017-05-02 NOTE — PATIENT INSTRUCTIONS
Managing Your Allergies: Care Instructions  Your Care Instructions  Managing your allergies is an important part of staying healthy. Your doctor will help you find out what may be causing the allergies. Common causes of allergy symptoms are house dust and dust mites, animal dander, mold, and pollen. As soon as you know what triggers your symptoms, try to reduce your exposure to your triggers. This can help prevent allergy symptoms, asthma, and other health problems. Ask your doctor about allergy medicine or immunotherapy. These treatments may help reduce or prevent allergy symptoms. Follow-up care is a key part of your treatment and safety. Be sure to make and go to all appointments, and call your doctor if you are having problems. It's also a good idea to know your test results and keep a list of the medicines you take. How can you care for yourself at home? · If you think that dust or dust mites are causing your allergies:  ¨ Wash sheets, pillowcases, and other bedding every week in hot water. ¨ Use airtight, dust-proof covers for pillows, duvets, and mattresses. Avoid plastic covers, because they tend to tear quickly and do not \"breathe. \" Wash according to the instructions. ¨ Remove extra blankets and pillows that you don't need. ¨ Use blankets that are machine-washable. ¨ Don't use home humidifiers. They can help mites live longer. · Use air-conditioning. Change or clean all filters every month. Keep windows closed. Use high-efficiency air filters. Don't use window or attic fans, which draw dust into the air. · If you're allergic to pet dander, keep pets outside or, at the very least, out of your bedroom. Old carpet and cloth-covered furniture can hold a lot of animal dander. You may need to replace them. · Look for signs of cockroaches. Use cockroach baits to get rid of them. Then clean your home well. · If you're allergic to mold, don't keep indoor plants, because molds can grow in soil.  Get rid of furniture, rugs, and drapes that smell musty. Check for mold in the bathroom. · If you're allergic to pollen, stay inside when pollen counts are high. · Don't smoke or let anyone else smoke in your house. Don't use fireplaces or wood-burning stoves. Avoid paint fumes, perfumes, and other strong odors. When should you call for help? Give an epinephrine shot if:  · You think you are having a severe allergic reaction. · You have symptoms in more than one body area, such as mild nausea and an itchy mouth. After giving an epinephrine shot call 911, even if you feel better. Call 911 if:  · You have symptoms of a severe allergic reaction. These may include:  ¨ Sudden raised, red areas (hives) all over your body. ¨ Swelling of the throat, mouth, lips, or tongue. ¨ Trouble breathing. ¨ Passing out (losing consciousness). Or you may feel very lightheaded or suddenly feel weak, confused, or restless. · You have been given an epinephrine shot, even if you feel better. Call your doctor now or seek immediate medical care if:  · You have symptoms of an allergic reaction, such as:  ¨ A rash or hives (raised, red areas on the skin). ¨ Itching. ¨ Swelling. ¨ Belly pain, nausea, or vomiting. Watch closely for changes in your health, and be sure to contact your doctor if:  · Your allergies get worse. · You need help controlling your allergies. · You have questions about allergy testing. · You do not get better as expected. Where can you learn more? Go to http://paco-saturnino.info/. Enter L249 in the search box to learn more about \"Managing Your Allergies: Care Instructions. \"  Current as of: February 12, 2016  Content Version: 11.2  © 2497-8079 [employer]. Care instructions adapted under license by [employer] (which disclaims liability or warranty for this information).  If you have questions about a medical condition or this instruction, always ask your healthcare professional. [address] any warranty or liability for your use of this information. Upper Respiratory Infection (Cold): Care Instructions  Your Care Instructions    An upper respiratory infection, or URI, is an infection of the nose, sinuses, or throat. URIs are spread by coughs, sneezes, and direct contact. The common cold is the most frequent kind of URI. The flu and sinus infections are other kinds of URIs. Almost all URIs are caused by viruses. Antibiotics won't cure them. But you can treat most infections with home care. This may include drinking lots of fluids and taking over-the-counter pain medicine. You will probably feel better in 4 to 10 days. The doctor has checked you carefully, but problems can develop later. If you notice any problems or new symptoms, get medical treatment right away. Follow-up care is a key part of your treatment and safety. Be sure to make and go to all appointments, and call your doctor if you are having problems. It's also a good idea to know your test results and keep a list of the medicines you take. How can you care for yourself at home? · To prevent dehydration, drink plenty of fluids, enough so that your urine is light yellow or clear like water. Choose water and other caffeine-free clear liquids until you feel better. If you have kidney, heart, or liver disease and have to limit fluids, talk with your doctor before you increase the amount of fluids you drink. · Take an over-the-counter pain medicine, such as acetaminophen (Tylenol), ibuprofen (Advil, Motrin), or naproxen (Aleve). Read and follow all instructions on the label. · Before you use cough and cold medicines, check the label. These medicines may not be safe for young children or for people with certain health problems. · Be careful when taking over-the-counter cold or flu medicines and Tylenol at the same time. Many of these medicines have acetaminophen, which is Tylenol.  Read the labels to make sure that you are not taking more than the recommended dose. Too much acetaminophen (Tylenol) can be harmful. · Get plenty of rest.  · Do not smoke or allow others to smoke around you. If you need help quitting, talk to your doctor about stop-smoking programs and medicines. These can increase your chances of quitting for good. When should you call for help? Call 911 anytime you think you may need emergency care. For example, call if:  · You have severe trouble breathing. Call your doctor now or seek immediate medical care if:  · You seem to be getting much sicker. · You have new or worse trouble breathing. · You have a new or higher fever. · You have a new rash. Watch closely for changes in your health, and be sure to contact your doctor if:  · You have a new symptom, such as a sore throat, an earache, or sinus pain. · You cough more deeply or more often, especially if you notice more mucus or a change in the color of your mucus. · You do not get better as expected. Where can you learn more? Go to http://paco-saturnino.info/. Enter O672 in the search box to learn more about \"Upper Respiratory Infection (Cold): Care Instructions. \"  Current as of: June 30, 2016  Content Version: 11.2  © 9136-3263 [employer]. Care instructions adapted under license by [employer] (which disclaims liability or warranty for this information). If you have questions about a medical condition or this instruction, always ask your healthcare professional. [place] [address] any warranty or liability for your use of this information.

## 2017-05-02 NOTE — MR AVS SNAPSHOT
Visit Information Date & Time Provider Department Dept. Phone Encounter #  
 5/2/2017 10:15 AM Lizet Tobin NP [facility] [phone] Follow-up Instructions Return if symptoms worsen or fail to improve. Your Appointments 6/5/2017  8:00 AM  
ROUTINE CARE with MD Nickie Grier 1997 ([address]) Appt Note: 3m fu mood/ chol [address] [address] 2 [place] [address] [address] Upcoming Health Maintenance Date Due INFLUENZA AGE 9 TO ADULT 8/1/2017 PAP AKA CERVICAL CYTOLOGY 12/6/2019 DTaP/Tdap/Td series (2 - Td) 4/1/2022 Allergies as of 5/2/2017  Review Complete On: 5/2/2017 By: Gilda Elias No Known Allergies Current Immunizations  Never Reviewed Name Date Influenza Vaccine 9/22/2016, 10/23/2013 Influenza Vaccine (Quad) PF 9/14/2015 Tdap 4/1/2012 Varicella Virus Vaccine 9/12/2014 Not reviewed this visit You Were Diagnosed With   
  
 Codes Comments Viral upper respiratory tract infection    -  Primary ICD-10-CM: J06.9, B97.89 ICD-9-CM: 465.9 Allergic rhinitis, unspecified allergic rhinitis trigger, unspecified rhinitis seasonality     ICD-10-CM: J30.9 ICD-9-CM: 477.9 Vitals BP Pulse Temp Resp Height(growth percentile) Weight(growth percentile) 138/77 68 98.1 °F (36.7 °C) 16 5' 6\" (1.676 m) 176 lb (79.8 kg) LMP SpO2 BMI OB Status Smoking Status 04/10/2017 94% 28.41 kg/m2 Having regular periods Never Smoker Vitals History BMI and BSA Data Body Mass Index Body Surface Area  
 28.41 kg/m 2 1.93 m 2 Preferred Pharmacy Pharmacy Name Phone [facility] [address] [facility] [phone] Your Updated Medication List  
  
   
This list is accurate as of: 5/2/17 10:46 AM.  Always use your most recent med list.  
  
  
  
  
 buPROPion  mg XL tablet Commonly known as:  [place] Bansal Take 1 Tab by mouth every morning. chlorpheniramine-HYDROcodone 10-8 mg/5 mL suspension Commonly known as:  Iván John Take 5 mL by mouth every twelve (12) hours as needed for Cough. Max Daily Amount: 10 mL. FIORICET -40 mg per tablet Generic drug:  butalbital-acetaminophen-caffeine Take 1 Tab by mouth. fluticasone 50 mcg/actuation nasal spray Commonly known as:  Katherne Fix 2 Sprays by Both Nostrils route daily. FOLIC ACID PO Take  by mouth. labetalol 100 mg tablet Commonly known as:  Leward Shone Take 1 Tab by mouth daily. meloxicam 7.5 mg tablet Commonly known as:  MOBIC METHOTREXATE  
by Does Not Apply route. Once weekly  
  
 montelukast 10 mg tablet Commonly known as:  SINGULAIR Take 1 Tab by mouth daily. multivitamin tablet Commonly known as:  ONE A DAY Take 1 Tab by mouth daily. nortriptyline 25 mg capsule Commonly known as:  PAMELOR Take 1 Cap by mouth nightly. PLAQUENIL 200 mg tablet Generic drug:  hydroxychloroquine Take 200 mg by mouth two (2) times a day. promethazine 25 mg tablet Commonly known as:  PHENERGAN  
  
 zolpidem CR 6.25 mg tablet Commonly known as:  AMBIEN CR Take 1 Tab by mouth nightly as needed for Sleep. Prescriptions Printed Refills  
 chlorpheniramine-HYDROcodone (TUSSIONEX) 10-8 mg/5 mL suspension 0 Sig: Take 5 mL by mouth every twelve (12) hours as needed for Cough. Max Daily Amount: 10 mL. Class: Print Route: Oral  
  
Follow-up Instructions Return if symptoms worsen or fail to improve. Patient Instructions Managing Your Allergies: Care Instructions Your Care Instructions Managing your allergies is an important part of staying healthy. Your doctor will help you find out what may be causing the allergies.  Common causes of allergy symptoms are house dust and dust mites, animal dander, mold, and pollen. As soon as you know what triggers your symptoms, try to reduce your exposure to your triggers. This can help prevent allergy symptoms, asthma, and other health problems. Ask your doctor about allergy medicine or immunotherapy. These treatments may help reduce or prevent allergy symptoms. Follow-up care is a key part of your treatment and safety. Be sure to make and go to all appointments, and call your doctor if you are having problems. It's also a good idea to know your test results and keep a list of the medicines you take. How can you care for yourself at home? · If you think that dust or dust mites are causing your allergies: 
¨ Wash sheets, pillowcases, and other bedding every week in hot water. ¨ Use airtight, dust-proof covers for pillows, duvets, and mattresses. Avoid plastic covers, because they tend to tear quickly and do not \"breathe. \" Wash according to the instructions. ¨ Remove extra blankets and pillows that you don't need. ¨ Use blankets that are machine-washable. ¨ Don't use home humidifiers. They can help mites live longer. · Use air-conditioning. Change or clean all filters every month. Keep windows closed. Use high-efficiency air filters. Don't use window or attic fans, which draw dust into the air. · If you're allergic to pet dander, keep pets outside or, at the very least, out of your bedroom. Old carpet and cloth-covered furniture can hold a lot of animal dander. You may need to replace them. · Look for signs of cockroaches. Use cockroach baits to get rid of them. Then clean your home well. · If you're allergic to mold, don't keep indoor plants, because molds can grow in soil. Get rid of furniture, rugs, and drapes that smell musty. Check for mold in the bathroom. · If you're allergic to pollen, stay inside when pollen counts are high. · Don't smoke or let anyone else smoke in your house. Don't use fireplaces or wood-burning stoves. Avoid paint fumes, perfumes, and other strong odors. When should you call for help? Give an epinephrine shot if: 
· You think you are having a severe allergic reaction. · You have symptoms in more than one body area, such as mild nausea and an itchy mouth. After giving an epinephrine shot call 911, even if you feel better. Call 911 if: 
· You have symptoms of a severe allergic reaction. These may include: 
¨ Sudden raised, red areas (hives) all over your body. ¨ Swelling of the throat, mouth, lips, or tongue. ¨ Trouble breathing. ¨ Passing out (losing consciousness). Or you may feel very lightheaded or suddenly feel weak, confused, or restless. · You have been given an epinephrine shot, even if you feel better. Call your doctor now or seek immediate medical care if: 
· You have symptoms of an allergic reaction, such as: ¨ A rash or hives (raised, red areas on the skin). ¨ Itching. ¨ Swelling. ¨ Belly pain, nausea, or vomiting. Watch closely for changes in your health, and be sure to contact your doctor if: 
· Your allergies get worse. · You need help controlling your allergies. · You have questions about allergy testing. · You do not get better as expected. Where can you learn more? Go to http://paco-saturnino.info/. Enter L249 in the search box to learn more about \"Managing Your Allergies: Care Instructions. \" Current as of: February 12, 2016 Content Version: 11.2 © 2014-5405 [employer]. Care instructions adapted under license by [employer] (which disclaims liability or warranty for this information). If you have questions about a medical condition or this instruction, always ask your healthcare professional. [address] any warranty or liability for your use of this information. Upper Respiratory Infection (Cold): Care Instructions Your Care Instructions An upper respiratory infection, or URI, is an infection of the nose, sinuses, or throat. URIs are spread by coughs, sneezes, and direct contact. The common cold is the most frequent kind of URI. The flu and sinus infections are other kinds of URIs. Almost all URIs are caused by viruses. Antibiotics won't cure them. But you can treat most infections with home care. This may include drinking lots of fluids and taking over-the-counter pain medicine. You will probably feel better in 4 to 10 days. The doctor has checked you carefully, but problems can develop later. If you notice any problems or new symptoms, get medical treatment right away. Follow-up care is a key part of your treatment and safety. Be sure to make and go to all appointments, and call your doctor if you are having problems. It's also a good idea to know your test results and keep a list of the medicines you take. How can you care for yourself at home? · To prevent dehydration, drink plenty of fluids, enough so that your urine is light yellow or clear like water. Choose water and other caffeine-free clear liquids until you feel better. If you have kidney, heart, or liver disease and have to limit fluids, talk with your doctor before you increase the amount of fluids you drink. · Take an over-the-counter pain medicine, such as acetaminophen (Tylenol), ibuprofen (Advil, Motrin), or naproxen (Aleve). Read and follow all instructions on the label. · Before you use cough and cold medicines, check the label. These medicines may not be safe for young children or for people with certain health problems. · Be careful when taking over-the-counter cold or flu medicines and Tylenol at the same time. Many of these medicines have acetaminophen, which is Tylenol.  Read the labels to make sure that you are not taking more than the recommended dose. Too much acetaminophen (Tylenol) can be harmful. · Get plenty of rest. 
· Do not smoke or allow others to smoke around you. If you need help quitting, talk to your doctor about stop-smoking programs and medicines. These can increase your chances of quitting for good. When should you call for help? Call 911 anytime you think you may need emergency care. For example, call if: 
· You have severe trouble breathing. Call your doctor now or seek immediate medical care if: 
· You seem to be getting much sicker. · You have new or worse trouble breathing. · You have a new or higher fever. · You have a new rash. Watch closely for changes in your health, and be sure to contact your doctor if: 
· You have a new symptom, such as a sore throat, an earache, or sinus pain. · You cough more deeply or more often, especially if you notice more mucus or a change in the color of your mucus. · You do not get better as expected. Where can you learn more? Go to http://paco-saturnino.info/. Enter O379 in the search box to learn more about \"Upper Respiratory Infection (Cold): Care Instructions. \" Current as of: June 30, 2016 Content Version: 11.2 © 6608-4425 [employer]. Care instructions adapted under license by [employer] (which disclaims liability or warranty for this information). If you have questions about a medical condition or this instruction, always ask your healthcare professional. [place] [address] any warranty or liability for your use of this information. Introducing [facility]! Janie Floyd introduces Centrify patient portal. Now you can access parts of your medical record, email your doctor's office, and request medication refills online. 1. In your internet browser, go to [email or web address] 2. Click on the First Time User? Click Here link in the Sign In box.  You will see the New Member Sign Up page. 3. Enter your Flicstart Access Code exactly as it appears below. You will not need to use this code after youve completed the sign-up process. If you do not sign up before the expiration date, you must request a new code. · Flicstart Access Code: [email or web address] Expires: 7/31/2017 10:45 AM 
 
4. Enter the last four digits of your Social Security Number (xxxx) and Date of Birth (mm/dd/yyyy) as indicated and click Submit. You will be taken to the next sign-up page. 5. Create a Flicstart ID. This will be your Flicstart login ID and cannot be changed, so think of one that is secure and easy to remember. 6. Create a Flicstart password. You can change your password at any time. 7. Enter your Password Reset Question and Answer. This can be used at a later time if you forget your password. 8. Enter your e-mail address. You will receive e-mail notification when new information is available in [address] 9. Click Sign Up. You can now view and download portions of your medical record. 10. Click the Download Summary menu link to download a portable copy of your medical information. If you have questions, please visit the Frequently Asked Questions section of the Flicstart website. Remember, Flicstart is NOT to be used for urgent needs. For medical emergencies, dial 911. Now available from your iPhone and Android! Please provide this summary of care documentation to your next provider. Your primary care clinician is listed as [address]. If you have any questions after today's visit, please call [phone].

## 2017-06-05 ENCOUNTER — OFFICE VISIT (OUTPATIENT)
Dept: FAMILY MEDICINE CLINIC | Age: 47
End: 2017-06-05

## 2017-06-05 VITALS
WEIGHT: 178 LBS | TEMPERATURE: 97.5 F | HEIGHT: 66 IN | OXYGEN SATURATION: 97 % | BODY MASS INDEX: 28.61 KG/M2 | HEART RATE: 63 BPM | SYSTOLIC BLOOD PRESSURE: 114 MMHG | DIASTOLIC BLOOD PRESSURE: 76 MMHG | RESPIRATION RATE: 16 BRPM

## 2017-06-05 DIAGNOSIS — F32.89 OTHER DEPRESSION: ICD-10-CM

## 2017-06-05 DIAGNOSIS — F51.01 PRIMARY INSOMNIA: ICD-10-CM

## 2017-06-05 DIAGNOSIS — E78.00 HYPERCHOLESTEROLEMIA: Primary | ICD-10-CM

## 2017-06-05 RX ORDER — ZOLPIDEM TARTRATE 6.25 MG/1
6.25 TABLET, FILM COATED, EXTENDED RELEASE ORAL
Qty: 30 TAB | Refills: 3 | Status: SHIPPED | OUTPATIENT
Start: 2017-07-05 | End: 2017-10-23 | Stop reason: SDUPTHER

## 2017-06-05 NOTE — PATIENT INSTRUCTIONS

## 2017-06-05 NOTE — PROGRESS NOTES
1. Have you been to the ER, urgent care clinic since your last visit? Hospitalized since your last visit? No    2. Have you seen or consulted any other health care providers outside of the [address] [place] [address] since your last visit? Include any pap smears or colon screening.  Yes Where: Dr. Deirdre Castrejon - rheumatology

## 2017-06-05 NOTE — MR AVS SNAPSHOT
Visit Information Date & Time Provider Department Dept. Phone Encounter #  
 6/5/2017  8:00 AM Ilya Parekh, [address]  [id number] Follow-up Instructions Return in about 6 months (around 12/5/2017) for well exam. Upcoming Health Maintenance Date Due INFLUENZA AGE 9 TO ADULT 8/1/2017 PAP AKA CERVICAL CYTOLOGY 12/6/2019 DTaP/Tdap/Td series (2 - Td) 4/1/2022 Allergies as of 6/5/2017  Review Complete On: 6/5/2017 By: Ilya Parekh MD  
 No Known Allergies Current Immunizations  Never Reviewed Name Date Influenza Vaccine 9/22/2016, 10/23/2013 Influenza Vaccine (Quad) PF 9/14/2015 Tdap 4/1/2012 Varicella Virus Vaccine 9/12/2014 Not reviewed this visit You Were Diagnosed With   
  
 Codes Comments Primary insomnia     ICD-10-CM: F51.01 
ICD-9-CM: 307.42 Vitals BP Pulse Temp Resp Height(growth percentile) Weight(growth percentile) 114/76 (BP 1 Location: Left arm, BP Patient Position: Sitting) 63 97.5 °F (36.4 °C) (Oral) 16 5' 6\" (1.676 m) 178 lb (80.7 kg) LMP SpO2 BMI OB Status Smoking Status 05/01/2017 97% 28.73 kg/m2 Having regular periods Never Smoker BMI and BSA Data Body Mass Index Body Surface Area 28.73 kg/m 2 1.94 m 2 Preferred Pharmacy Pharmacy Name Phone [facility] [facility] [phone] Your Updated Medication List  
  
   
This list is accurate as of: 6/5/17  8:32 AM.  Always use your most recent med list.  
  
  
  
  
 Lorean Stephani Take  by mouth. FIORICET -40 mg per tablet Generic drug:  butalbital-acetaminophen-caffeine Take 1 Tab by mouth. fluticasone 50 mcg/actuation nasal spray Commonly known as:  Madan Settles 2 Sprays by Both Nostrils route daily. FOLIC ACID PO Take  by mouth. labetalol 100 mg tablet Commonly known as:  Ashia Infante  
 Take 1 Tab by mouth daily. meloxicam 7.5 mg tablet Commonly known as:  MOBIC METHOTREXATE  
by Does Not Apply route. Once weekly  
  
 multivitamin tablet Commonly known as:  ONE A DAY Take 1 Tab by mouth daily. nortriptyline 25 mg capsule Commonly known as:  PAMELOR Take 1 Cap by mouth nightly. PLAQUENIL 200 mg tablet Generic drug:  hydroxychloroquine Take 200 mg by mouth two (2) times a day. promethazine 25 mg tablet Commonly known as:  PHENERGAN  
  
 zolpidem CR 6.25 mg tablet Commonly known as:  AMBIEN CR Take 1 Tab by mouth nightly as needed for Sleep. Start taking on:  7/5/2017 Prescriptions Printed Refills  
 zolpidem CR (AMBIEN CR) 6.25 mg tablet 3 Starting on: 7/5/2017 Sig: Take 1 Tab by mouth nightly as needed for Sleep. Class: Print Route: Oral  
  
Follow-up Instructions Return in about 6 months (around 12/5/2017) for well exam.  
  
  
Patient Instructions DASH Diet: Care Instructions Your Care Instructions The DASH diet is an eating plan that can help lower your blood pressure. DASH stands for Dietary Approaches to Stop Hypertension. Hypertension is high blood pressure. The DASH diet focuses on eating foods that are high in calcium, potassium, and magnesium. These nutrients can lower blood pressure. The foods that are highest in these nutrients are fruits, vegetables, low-fat dairy products, nuts, seeds, and legumes. But taking calcium, potassium, and magnesium supplements instead of eating foods that are high in those nutrients does not have the same effect. The DASH diet also includes whole grains, fish, and poultry. The DASH diet is one of several lifestyle changes your doctor may recommend to lower your high blood pressure. Your doctor may also want you to decrease the amount of sodium in your diet.  Lowering sodium while following the DASH diet can lower blood pressure even further than just the DASH diet alone. Follow-up care is a key part of your treatment and safety. Be sure to make and go to all appointments, and call your doctor if you are having problems. It's also a good idea to know your test results and keep a list of the medicines you take. How can you care for yourself at home? Following the DASH diet · Eat 4 to 5 servings of fruit each day. A serving is 1 medium-sized piece of fruit, ½ cup chopped or canned fruit, 1/4 cup dried fruit, or 4 ounces (½ cup) of fruit juice. Choose fruit more often than fruit juice. · Eat 4 to 5 servings of vegetables each day. A serving is 1 cup of lettuce or raw leafy vegetables, ½ cup of chopped or cooked vegetables, or 4 ounces (½ cup) of vegetable juice. Choose vegetables more often than vegetable juice. · Get 2 to 3 servings of low-fat and fat-free dairy each day. A serving is 8 ounces of milk, 1 cup of yogurt, or 1 ½ ounces of cheese. · Eat 6 to 8 servings of grains each day. A serving is 1 slice of bread, 1 ounce of dry cereal, or ½ cup of cooked rice, pasta, or cooked cereal. Try to choose whole-grain products as much as possible. · Limit lean meat, poultry, and fish to 2 servings each day. A serving is 3 ounces, about the size of a deck of cards. · Eat 4 to 5 servings of nuts, seeds, and legumes (cooked dried beans, lentils, and split peas) each week. A serving is 1/3 cup of nuts, 2 tablespoons of seeds, or ½ cup of cooked beans or peas. · Limit fats and oils to 2 to 3 servings each day. A serving is 1 teaspoon of vegetable oil or 2 tablespoons of salad dressing. · Limit sweets and added sugars to 5 servings or less a week. A serving is 1 tablespoon jelly or jam, ½ cup sorbet, or 1 cup of lemonade. · Eat less than 2,300 milligrams (mg) of sodium a day. If you limit your sodium to 1,500 mg a day, you can lower your blood pressure even more. Tips for success · Start small.  Do not try to make dramatic changes to your diet all at once. You might feel that you are missing out on your favorite foods and then be more likely to not follow the plan. Make small changes, and stick with them. Once those changes become habit, add a few more changes. · Try some of the following: ¨ Make it a goal to eat a fruit or vegetable at every meal and at snacks. This will make it easy to get the recommended amount of fruits and vegetables each day. ¨ Try yogurt topped with fruit and nuts for a snack or healthy dessert. ¨ Add lettuce, tomato, cucumber, and onion to sandwiches. ¨ Combine a ready-made pizza crust with low-fat mozzarella cheese and lots of vegetable toppings. Try using tomatoes, squash, spinach, broccoli, carrots, cauliflower, and onions. ¨ Have a variety of cut-up vegetables with a low-fat dip as an appetizer instead of chips and dip. ¨ Sprinkle sunflower seeds or chopped almonds over salads. Or try adding chopped walnuts or almonds to cooked vegetables. ¨ Try some vegetarian meals using beans and peas. Add garbanzo or kidney beans to salads. Make burritos and tacos with mashed elkins beans or black beans. Where can you learn more? Go to http://paco-saturnino.info/. Enter K420 in the search box to learn more about \"DASH Diet: Care Instructions. \" Current as of: March 23, 2016 Content Version: 11.2 © 2657-5517 [employer]. Care instructions adapted under license by [employer] (which disclaims liability or warranty for this information). If you have questions about a medical condition or this instruction, always ask your healthcare professional. [place] [address] any warranty or liability for your use of this information. Introducing [facility]! Cheli Nolan introduces [employer] patient portal. Now you can access parts of your medical record, email your doctor's office, and request medication refills online.    
 
1. In your internet browser, go to [email or web address] 2. Click on the First Time User? Click Here link in the Sign In box. You will see the New Member Sign Up page. 3. Enter your [employer] Access Code exactly as it appears below. You will not need to use this code after youve completed the sign-up process. If you do not sign up before the expiration date, you must request a new code. · [employer] Access Code: [email or web address] Expires: 7/31/2017 10:45 AM 
 
4. Enter the last four digits of your Social Security Number (xxxx) and Date of Birth (mm/dd/yyyy) as indicated and click Submit. You will be taken to the next sign-up page. 5. Create a [email or web address] ID. This will be your [employer] login ID and cannot be changed, so think of one that is secure and easy to remember. 6. Create a [employer] password. You can change your password at any time. 7. Enter your Password Reset Question and Answer. This can be used at a later time if you forget your password. 8. Enter your e-mail address. You will receive e-mail notification when new information is available in [address] 9. Click Sign Up. You can now view and download portions of your medical record. 10. Click the Download Summary menu link to download a portable copy of your medical information. If you have questions, please visit the Frequently Asked Questions section of the [employer] website. Remember, [employer] is NOT to be used for urgent needs. For medical emergencies, dial 911. Now available from your iPhone and Android! Please provide this summary of care documentation to your next provider. Your primary care clinician is listed as [address]. If you have any questions after today's visit, please call [phone].

## 2017-06-05 NOTE — PROGRESS NOTES
HISTORY OF PRESENT ILLNESS  Julianna Campos is a 55 y.o. female. HPI  Patient is here today for evaluation and treatment of: Cholesterol/Depression    Cholesterol:   Pt has had recent blood work done thru her 's job ( via finger stick) Had a total chol of 168, and HDL of 56. Would like to recheck labs in December. Depression: She has been on wellbutrin for the last 6 months; she states her mood is stable. She wants to come off the med. Has primary insomnia; She is on [place]; Will need a refill of med in about 6 weeks; Med helps her sleep; She is not overly sedated with med. Current Outpatient Prescriptions:     LORATADINE (CLARITIN PO), Take  by mouth., Disp: , Rfl:     [START ON 7/5/2017] zolpidem CR (AMBIEN CR) 6.25 mg tablet, Take 1 Tab by mouth nightly as needed for Sleep., Disp: 30 Tab, Rfl: 3    promethazine (PHENERGAN) 25 mg tablet, , Disp: , Rfl:     meloxicam (MOBIC) 7.5 mg tablet, , Disp: , Rfl:     nortriptyline (PAMELOR) 25 mg capsule, Take 1 Cap by mouth nightly., Disp: 30 Cap, Rfl: 6    labetalol (NORMODYNE) 100 mg tablet, Take 1 Tab by mouth daily. , Disp: 90 Tab, Rfl: 3    FOLIC ACID PO, Take  by mouth., Disp: , Rfl:     fluticasone (FLONASE) 50 mcg/actuation nasal spray, 2 Sprays by Both Nostrils route daily. , Disp: 1 Bottle, Rfl: 3    multivitamin (ONE A DAY) tablet, Take 1 Tab by mouth daily. , Disp: , Rfl:     METHOTREXATE, by Does Not Apply route. Once weekly, Disp: , Rfl:     hydroxychloroquine (PLAQUENIL) 200 mg tablet, Take 200 mg by mouth two (2) times a day., Disp: , Rfl:     butalbital-acetaminophen-caffeine (FIORICET) -40 mg per tablet, Take 1 Tab by mouth., Disp: , Rfl:     PMH,  Meds, Allergies, Family History, Social history reviewed  Review of Systems   Constitutional: Negative for chills and fever. Cardiovascular: Negative for chest pain and leg swelling. Physical Exam   Constitutional: She appears well-developed and well-nourished.  No distress. Cardiovascular: Normal rate and regular rhythm. Exam reveals no gallop and no friction rub. No murmur heard. Pulmonary/Chest: Breath sounds normal. No respiratory distress. She has no wheezes. She has no rales. Musculoskeletal: She exhibits no edema. Nursing note and vitals reviewed. Visit Vitals    /76 (BP 1 Location: Left arm, BP Patient Position: Sitting)    Pulse 63    Temp 97.5 °F (36.4 °C) (Oral)    Resp 16    Ht 5' 6\" (1.676 m)    Wt 178 lb (80.7 kg)    LMP 05/01/2017    SpO2 97%    BMI 28.73 kg/m2         ASSESSMENT and PLAN    ICD-10-CM ICD-9-CM    1. Hypercholesterolemia E78.00 272.0    2. Primary insomnia F51.01 307.42 zolpidem CR (AMBIEN CR) 6.25 mg tablet   3. Other depression F32.89 311        As above,   above all stable unless otherwise noted  Refilled ambien as ordered; prudent use advised  Hold wellbutrin as pt is stable and she has been on meds for about 6 months  Follow-up Disposition:  Return in about 6 months (around 12/5/2017) for well exam.  An After Visit Summary was printed and given to the patient. This has been fully explained to the patient, who indicates understanding.

## 2017-06-06 RX ORDER — LABETALOL 100 MG/1
TABLET, FILM COATED ORAL
Qty: 30 TAB | Refills: 2 | Status: SHIPPED | OUTPATIENT
Start: 2017-06-06 | End: 2017-12-11 | Stop reason: SDUPTHER

## 2017-10-10 ENCOUNTER — TELEPHONE (OUTPATIENT)
Dept: FAMILY MEDICINE CLINIC | Age: 47
End: 2017-10-10

## 2017-10-10 DIAGNOSIS — F51.01 PRIMARY INSOMNIA: ICD-10-CM

## 2017-10-10 RX ORDER — ZOLPIDEM TARTRATE 6.25 MG/1
6.25 TABLET, FILM COATED, EXTENDED RELEASE ORAL
Qty: 30 TAB | Refills: 2 | Status: CANCELLED | OUTPATIENT
Start: 2017-10-10

## 2017-10-10 NOTE — TELEPHONE ENCOUNTER
Pt request to speak with Trini re: refill for Aquiles Ryan    She said the way her ins processes she can only get quantity of 15 per refill, so her refill runs out early as she has to get 2 refills per month    Needs asap

## 2017-10-10 NOTE — TELEPHONE ENCOUNTER
Spoke with patient. Advised patient that she has a refill remaining at the pharmacy #15 tablets. She verbalized understanding. Patient would like refill request at this time will run out of medication in 15 days. Will send to the provider for review.  She verbalized understanding

## 2017-10-23 DIAGNOSIS — F51.01 PRIMARY INSOMNIA: ICD-10-CM

## 2017-10-23 RX ORDER — ZOLPIDEM TARTRATE 6.25 MG/1
6.25 TABLET, FILM COATED, EXTENDED RELEASE ORAL
Qty: 30 TAB | Refills: 3 | Status: SHIPPED | OUTPATIENT
Start: 2017-10-23 | End: 2017-10-24 | Stop reason: SDUPTHER

## 2017-10-24 DIAGNOSIS — F51.01 PRIMARY INSOMNIA: ICD-10-CM

## 2017-10-24 RX ORDER — ZOLPIDEM TARTRATE 6.25 MG/1
6.25 TABLET, FILM COATED, EXTENDED RELEASE ORAL
Qty: 30 TAB | Refills: 2 | Status: SHIPPED | OUTPATIENT
Start: 2017-10-24 | End: 2017-12-11 | Stop reason: SDUPTHER

## 2017-12-11 ENCOUNTER — OFFICE VISIT (OUTPATIENT)
Dept: FAMILY MEDICINE CLINIC | Age: 47
End: 2017-12-11

## 2017-12-11 VITALS
SYSTOLIC BLOOD PRESSURE: 138 MMHG | HEART RATE: 76 BPM | DIASTOLIC BLOOD PRESSURE: 80 MMHG | RESPIRATION RATE: 16 BRPM | OXYGEN SATURATION: 98 % | WEIGHT: 194 LBS | HEIGHT: 66 IN | TEMPERATURE: 97.8 F | BODY MASS INDEX: 31.18 KG/M2

## 2017-12-11 DIAGNOSIS — Z12.39 SCREENING FOR BREAST CANCER: ICD-10-CM

## 2017-12-11 DIAGNOSIS — F51.01 PRIMARY INSOMNIA: ICD-10-CM

## 2017-12-11 DIAGNOSIS — Z00.00 WELL WOMAN EXAM (NO GYNECOLOGICAL EXAM): Primary | ICD-10-CM

## 2017-12-11 RX ORDER — PROMETHAZINE HYDROCHLORIDE 25 MG/1
25 TABLET ORAL
Qty: 30 TAB | Refills: 2 | Status: SHIPPED | OUTPATIENT
Start: 2017-12-11 | End: 2018-06-06 | Stop reason: SDUPTHER

## 2017-12-11 RX ORDER — GABAPENTIN 300 MG/1
CAPSULE ORAL
COMMUNITY
Start: 2017-12-02 | End: 2019-06-27

## 2017-12-11 RX ORDER — NORTRIPTYLINE HYDROCHLORIDE 25 MG/1
25 CAPSULE ORAL
Qty: 30 CAP | Refills: 6 | Status: SHIPPED | OUTPATIENT
Start: 2017-12-11 | End: 2018-06-06 | Stop reason: SDUPTHER

## 2017-12-11 RX ORDER — LABETALOL 100 MG/1
TABLET, FILM COATED ORAL
Qty: 30 TAB | Refills: 2 | Status: SHIPPED | OUTPATIENT
Start: 2017-12-11 | End: 2018-04-13 | Stop reason: SDUPTHER

## 2017-12-11 RX ORDER — ZOLPIDEM TARTRATE 6.25 MG/1
6.25 TABLET, FILM COATED, EXTENDED RELEASE ORAL
Qty: 30 TAB | Refills: 2 | Status: SHIPPED | OUTPATIENT
Start: 2018-01-24 | End: 2018-04-07 | Stop reason: SDUPTHER

## 2017-12-11 NOTE — PROGRESS NOTES
1. Have you been to the ER, urgent care clinic since your last visit? Hospitalized since your last visit? No    2. Have you seen or consulted any other health care providers outside of the [address] [place] [address] since your last visit? Include any pap smears or colon screening.  No

## 2017-12-11 NOTE — PROGRESS NOTES
Subjective:   55 y.o. female for Well Woman Check. Patient's last menstrual period was 08/24/2017. Doing well;  Needs a refill on some meds. She is not de for a pap; She needs a mammogram order. Declines blood work today    Social History: . Pertinent past medical hstory: as below. Patient Active Problem List    Diagnosis Date Noted    Mixed connective tissue disease ([facility] [address] 09/12/2014    HTN (hypertension) 12/28/2012    Migraine     Insomnia     Screening for cervical cancer 04/06/2010     Current Outpatient Prescriptions   Medication Sig Dispense Refill    [START ON 1/24/2018] zolpidem CR (AMBIEN CR) 6.25 mg tablet Take 1 Tab by mouth nightly as needed for Sleep. 30 Tab 2    labetalol (NORMODYNE) 100 mg tablet TAKE ONE TABLET BY MOUTH DAILY 30 Tab 2    nortriptyline (PAMELOR) 25 mg capsule Take 1 Cap by mouth nightly. 30 Cap 6    promethazine (PHENERGAN) 25 mg tablet Take 1 Tab by mouth every eight (8) hours as needed for Nausea. 30 Tab 2    LORATADINE (CLARITIN PO) Take  by mouth.  meloxicam (MOBIC) 7.5 mg tablet       FOLIC ACID PO Take  by mouth.  fluticasone (FLONASE) 50 mcg/actuation nasal spray 2 Sprays by Both Nostrils route daily. 1 Bottle 3    multivitamin (ONE A DAY) tablet Take 1 Tab by mouth daily.  METHOTREXATE by Does Not Apply route. Once weekly      hydroxychloroquine (PLAQUENIL) 200 mg tablet Take 200 mg by mouth two (2) times a day.  butalbital-acetaminophen-caffeine (FIORICET) -40 mg per tablet Take 1 Tab by mouth.       gabapentin (NEURONTIN) 300 mg capsule        No Known Allergies  Past Medical History:   Diagnosis Date    Insomnia     Migraine     Screening for cervical cancer 4/6/2010     Past Surgical History:   Procedure Laterality Date    HX APPENDECTOMY  7/1/09    HX CHOLECYSTECTOMY  2001     Family History   Problem Relation Age of Onset    Alcohol abuse Father     Diabetes Mother     Lung Disease Mother      COPD    Crohn's Disease Maternal Grandmother      Social History   Substance Use Topics    Smoking status: Never Smoker    Smokeless tobacco: Never Used    Alcohol use 0.0 oz/week     0 Standard drinks or equivalent per week      Comment: occasionally        ROS:  Feeling well. No dyspnea or chest pain on exertion. No abdominal pain, change in bowel habits, black or bloody stools. No urinary tract symptoms. GYN ROS:, no abnormal bleeding, pelvic pain or discharge, no breast pain or new or enlarging lumps on self exam. No neurological complaints. Objective:     Visit Vitals    /80 (BP 1 Location: Left arm, BP Patient Position: Sitting)    Pulse 76    Temp 97.8 °F (36.6 °C) (Oral)    Resp 16    Ht 5' 6\" (1.676 m)    Wt 194 lb (88 kg)    LMP 08/24/2017    SpO2 98%    BMI 31.31 kg/m2     The patient appears well, alert, oriented x 3, in no distress. ENT normal.  Neck supple. No adenopathy or thyromegaly. MARIE. Lungs are clear, good air entry, no wheezes, rhonchi or rales. S1 and S2 normal, no murmurs, regular rate and rhythm. Abdomen soft without tenderness, guarding, mass or organomegaly. Extremities show no edema, normal peripheral pulses. Neurological is normal, no focal findings. BREAST EXAM: breasts appear normal, no suspicious masses, no skin or nipple changes or axillary nodes    PELVIC EXAM: deferred    Assessment/Plan:   well woman  mammogram  return annually or prn    ICD-10-CM ICD-9-CM    1. Well woman exam (no gynecological exam) Z00.00 V70.0    2. Screening for breast cancer Z12.31 V76.10 [facility] MAMMO BI SCREENING INCL CAD   3. Primary insomnia- for refill only F51.01 307.42 zolpidem CR (AMBIEN CR) 6.25 mg tablet   . As above. Pt well and stable  Follow-up Disposition:  Return in about 6 months (around 6/11/2018) for chol. An After Visit Summary was printed and given to the patient. This has been fully explained to the patient, who indicates understanding.

## 2017-12-11 NOTE — PATIENT INSTRUCTIONS

## 2017-12-11 NOTE — MR AVS SNAPSHOT
Frances Mccoy 
 
 
 [address], [place] [address] [address] [address] 
[phone] Patient: Isauro Duggan 
MRN: [id number]:[id number] Visit Information Date & Time Provider Department Dept. Phone Encounter #  
 12/11/2017  9:40 AM Giovanni Mon, [address] [place] [address] [id number] Follow-up Instructions Return in about 6 months (around 6/11/2018) for chol. Upcoming Health Maintenance Date Due Influenza Age 5 to Adult 8/1/2017 PAP AKA CERVICAL CYTOLOGY 12/6/2019 DTaP/Tdap/Td series (2 - Td) 4/1/2022 Allergies as of 12/11/2017  Review Complete On: 12/11/2017 By: Giovanni Mon MD  
 No Known Allergies Current Immunizations  Never Reviewed Name Date Influenza Vaccine 9/28/2017, 9/22/2016, 10/23/2013 Influenza Vaccine (Quad) PF 9/14/2015 Tdap 4/1/2012 Varicella Virus Vaccine 9/12/2014 Not reviewed this visit You Were Diagnosed With   
  
 Codes Comments Well woman exam with routine gynecological exam    -  Primary ICD-10-CM: S00.761 ICD-9-CM: V72.31 [V72.31] Primary insomnia     ICD-10-CM: F51.01 
ICD-9-CM: 307.42 Screening for cervical cancer     ICD-10-CM: Z12.4 ICD-9-CM: V76.2 Screening for breast cancer     ICD-10-CM: Z12.31 
ICD-9-CM: V76.10 Vitals BP Pulse Temp Resp Height(growth percentile) Weight(growth percentile) 138/80 (BP 1 Location: Left arm, BP Patient Position: Sitting) 76 97.8 °F (36.6 °C) (Oral) 16 5' 6\" (1.676 m) 194 lb (88 kg) LMP SpO2 BMI OB Status Smoking Status 08/24/2017 98% 31.31 kg/m2 Having regular periods Never Smoker BMI and BSA Data Body Mass Index Body Surface Area  
 31.31 kg/m 2 2.02 m 2 Preferred Pharmacy Pharmacy Name Phone [facility] [address], [address] [facility] [phone] Your Updated Medication List  
  
   
 This list is accurate as of: 12/11/17 10:06 AM.  Always use your most recent med list.  
  
  
  
  
 Brii Ramos Take  by mouth. FIORICET -40 mg per tablet Generic drug:  butalbital-acetaminophen-caffeine Take 1 Tab by mouth. fluticasone 50 mcg/actuation nasal spray Commonly known as:  Minerva Gut 2 Sprays by Both Nostrils route daily. FOLIC ACID PO Take  by mouth.  
  
 gabapentin 300 mg capsule Commonly known as:  NEURONTIN  
  
 labetalol 100 mg tablet Commonly known as:  NORMODYNE  
TAKE ONE TABLET BY MOUTH DAILY  
  
 meloxicam 7.5 mg tablet Commonly known as:  MOBIC METHOTREXATE  
by Does Not Apply route. Once weekly  
  
 multivitamin tablet Commonly known as:  ONE A DAY Take 1 Tab by mouth daily. nortriptyline 25 mg capsule Commonly known as:  PAMELOR Take 1 Cap by mouth nightly. PLAQUENIL 200 mg tablet Generic drug:  hydroxychloroquine Take 200 mg by mouth two (2) times a day. promethazine 25 mg tablet Commonly known as:  PHENERGAN Take 1 Tab by mouth every eight (8) hours as needed for Nausea. zolpidem CR 6.25 mg tablet Commonly known as:  AMBIEN CR Take 1 Tab by mouth nightly as needed for Sleep. Start taking on:  1/24/2018 Prescriptions Printed Refills  
 zolpidem CR (AMBIEN CR) 6.25 mg tablet 2 Starting on: 1/24/2018 Sig: Take 1 Tab by mouth nightly as needed for Sleep. Class: Print Route: Oral  
  
Prescriptions Sent to Pharmacy Refills  
 labetalol (NORMODYNE) 100 mg tablet 2 Sig: TAKE ONE TABLET BY MOUTH DAILY Class: Normal  
 Pharmacy: [facility] [address] [place] [address] Regla Rodríguez Ph #: [phone]  
 nortriptyline (PAMELOR) 25 mg capsule 6 Sig: Take 1 Cap by mouth nightly. Class: Normal  
 [facility]acy: [facility] [address] Ph #: [phone]  Route: Oral  
 promethazine (PHENERGAN) 25 mg tablet 2  
 Sig: Take 1 Tab by mouth every eight (8) hours as needed for Nausea. Class: Normal  
 Pharmacy: [facility] [address] [place] [address], [address] [place] [address] #: [phone] Route: Oral  
  
Follow-up Instructions Return in about 6 months (around 6/11/2018) for chol. To-Do List   
 12/11/2017 Imaging:  [facility] MAMMO BI SCREENING INCL CAD Patient Instructions Well Visit, Ages 25 to 48: Care Instructions Your Care Instructions Physical exams can help you stay healthy. Your doctor has checked your overall health and may have suggested ways to take good care of yourself. He or she also may have recommended tests. At home, you can help prevent illness with healthy eating, regular exercise, and other steps. Follow-up care is a key part of your treatment and safety. Be sure to make and go to all appointments, and call your doctor if you are having problems. It's also a good idea to know your test results and keep a list of the medicines you take. How can you care for yourself at home? · Reach and stay at a healthy weight. This will lower your risk for many problems, such as obesity, diabetes, heart disease, and high blood pressure. · Get at least 30 minutes of physical activity on most days of the week. Walking is a good choice. You also may want to do other activities, such as running, swimming, cycling, or playing tennis or team sports. Discuss any changes in your exercise program with your doctor. · Do not smoke or allow others to smoke around you. If you need help quitting, talk to your doctor about stop-smoking programs and medicines. These can increase your chances of quitting for good. · Talk to your doctor about whether you have any risk factors for sexually transmitted infections (STIs). Having one sex partner (who does not have STIs and does not have sex with anyone else) is a good way to avoid these infections. · Use birth control if you do not want to have children at this time. Talk with your doctor about the choices available and what might be best for you. · Protect your skin from too much sun. When you're outdoors from 10 a.m. to 4 p.m., stay in the shade or cover up with clothing and a hat with a wide brim. Wear sunglasses that block UV rays. Even when it's cloudy, put broad-spectrum sunscreen (SPF 30 or higher) on any exposed skin. · See a dentist one or two times a year for checkups and to have your teeth cleaned. · Wear a seat belt in the car. · Drink alcohol in moderation, if at all. That means no more than 2 drinks a day for men and 1 drink a day for women. Follow your doctor's advice about when to have certain tests. These tests can spot problems early. For everyone · Cholesterol. Have the fat (cholesterol) in your blood tested after age 21. Your doctor will tell you how often to have this done based on your age, family history, or other things that can increase your risk for heart disease. · Blood pressure. Have your blood pressure checked during a routine doctor visit. Your doctor will tell you how often to check your blood pressure based on your age, your blood pressure results, and other factors. · Vision. Talk with your doctor about how often to have a glaucoma test. 
· Diabetes. Ask your doctor whether you should have tests for diabetes. · Colon cancer. Have a test for colon cancer at age 48. You may have one of several tests. If you are younger than 48, you may need a test earlier if you have any risk factors. Risk factors include whether you already had a precancerous polyp removed from your colon or whether your parent, brother, sister, or child has had colon cancer. For women · Breast exam and mammogram. Talk to your doctor about when you should have a clinical breast exam and a mammogram. Medical experts differ on whether and how often women under 50 should have these tests.  Your doctor can help you decide what is right for you. · Pap test and pelvic exam. Begin Pap tests at age 24. A Pap test is the best way to find cervical cancer. The test often is part of a pelvic exam. Ask how often to have this test. 
· Tests for sexually transmitted infections (STIs). Ask whether you should have tests for STIs. You may be at risk if you have sex with more than one person, especially if your partners do not wear condoms. For men · Tests for sexually transmitted infections (STIs). Ask whether you should have tests for STIs. You may be at risk if you have sex with more than one person, especially if you do not wear a condom. · Testicular cancer exam. Ask your doctor whether you should check your testicles regularly. · Prostate exam. Talk to your doctor about whether you should have a blood test (called a PSA test) for prostate cancer. Experts differ on whether and when men should have this test. Some experts suggest it if you are older than 39 and are -American or have a father or brother who got prostate cancer when he was younger than 72. When should you call for help? Watch closely for changes in your health, and be sure to contact your doctor if you have any problems or symptoms that concern you. Where can you learn more? Go to http://paco-saturnino.info/. Enter P072 in the search box to learn more about \"Well Visit, Ages 25 to 48: Care Instructions. \" Current as of: May 12, 2017 Content Version: 11.4 © 9089-1005 Healthwise, Incorporated. Care instructions adapted under license by [employer] (which disclaims liability or warranty for this information). If you have questions about a medical condition or this instruction, always ask your healthcare professional. [place] [address] any warranty or liability for your use of this information. Introducing [facility]!    
 [facility] introduces Qminder patient portal. Now you can access parts of your medical record, email your doctor's office, and request medication refills online. 1. In your internet browser, go to [email or web address] [email or web address] 2. Click on the First Time User? Click Here link in the Sign In box. You will see the New Member Sign Up page. 3. Enter your Booklr Access Code exactly as it appears below. You will not need to use this code after youve completed the sign-up process. If you do not sign up before the expiration date, you must request a new code. · Booklr Access Code: [email or web address] Expires: 3/11/2018  9:48 AM 
 
4. Enter the last four digits of your Social Security Number (xxxx) and Date of Birth (mm/dd/yyyy) as indicated and click Submit. You will be taken to the next sign-up page. 5. Create a Booklr ID. This will be your Booklr login ID and cannot be changed, so think of one that is secure and easy to remember. 6. Create a Booklr password. You can change your password at any time. 7. Enter your Password Reset Question and Answer. This can be used at a later time if you forget your password. 8. Enter your e-mail address. You will receive e-mail notification when new information is available in [address] 9. Click Sign Up. You can now view and download portions of your medical record. 10. Click the Download Summary menu link to download a portable copy of your medical information. If you have questions, please visit the Frequently Asked Questions section of the Booklr website. Remember, Booklr is NOT to be used for urgent needs. For medical emergencies, dial 911. Now available from your iPhone and Android! Please provide this summary of care documentation to your next provider. Your primary care clinician is listed as [address]. If you have any questions after today's visit, please call [phone].

## 2018-01-25 ENCOUNTER — HOSPITAL ENCOUNTER (OUTPATIENT)
Dept: MAMMOGRAPHY | Age: 48
Discharge: HOME | End: 2018-01-25
Attending: FAMILY MEDICINE
Payer: COMMERCIAL

## 2018-01-25 DIAGNOSIS — Z12.39 SCREENING FOR BREAST CANCER: ICD-10-CM

## 2018-01-25 PROCEDURE — 77067 SCR MAMMO BI INCL CAD: CPT

## 2018-02-05 ENCOUNTER — TELEPHONE (OUTPATIENT)
Dept: FAMILY MEDICINE CLINIC | Age: 48
End: 2018-02-05

## 2018-02-05 NOTE — TELEPHONE ENCOUNTER
Patient called in and stated that she received a letter from [facility] that she requires further evaluation and the radiologist had recommended   Additional Views Diagnostic Mammogram - Left  Additional Imaging Ultrasound - Left. Please advise.

## 2018-02-06 NOTE — TELEPHONE ENCOUNTER
Spoke with the patient today. Advised patient additional views have been faxed to [facility]. She verbalized understanding. Order to be scanned into media.

## 2018-02-09 ENCOUNTER — NURSE NAVIGATOR (OUTPATIENT)
Dept: MAMMOGRAPHY | Age: 48
End: 2018-02-09

## 2018-02-09 ENCOUNTER — HOSPITAL ENCOUNTER (OUTPATIENT)
Dept: MAMMOGRAPHY | Age: 48
Discharge: HOME | End: 2018-02-09
Attending: FAMILY MEDICINE
Payer: COMMERCIAL

## 2018-02-09 ENCOUNTER — HOSPITAL ENCOUNTER (OUTPATIENT)
Dept: ULTRASOUND IMAGING | Age: 48
Discharge: HOME | End: 2018-02-09
Attending: FAMILY MEDICINE
Payer: COMMERCIAL

## 2018-02-09 DIAGNOSIS — N63.0 LUMP OR MASS IN BREAST: ICD-10-CM

## 2018-02-09 DIAGNOSIS — R92.8 ABNORMAL MAMMOGRAM: ICD-10-CM

## 2018-02-09 PROCEDURE — 77065 DX MAMMO INCL CAD UNI: CPT

## 2018-02-09 PROCEDURE — 76642 ULTRASOUND BREAST LIMITED: CPT

## 2018-02-09 NOTE — PROGRESS NOTES
Met with Ms. Kaufman regarding recommendation for Left Breast Ultrasound Biopsy. Per provider preference, she has been scheduled to see Dr. Noemy Luciano for follow up.  She has a consult appointment scheduled for 2/20/18 @ 1030am

## 2018-02-20 ENCOUNTER — OFFICE VISIT (OUTPATIENT)
Dept: SURGERY | Age: 48
End: 2018-02-20

## 2018-02-20 VITALS
BODY MASS INDEX: 31.64 KG/M2 | TEMPERATURE: 98.3 F | WEIGHT: 196 LBS | DIASTOLIC BLOOD PRESSURE: 86 MMHG | HEART RATE: 76 BPM | SYSTOLIC BLOOD PRESSURE: 132 MMHG | RESPIRATION RATE: 18 BRPM

## 2018-02-20 DIAGNOSIS — N63.20 LEFT BREAST MASS: ICD-10-CM

## 2018-02-20 DIAGNOSIS — R92.8 ABNORMAL ULTRASOUND OF BREAST: Primary | ICD-10-CM

## 2018-02-20 NOTE — PROGRESS NOTES
[facility]  General Surgery    Ultrasound of the left breast was performed from the 6:00 to 9:00 position in the lower inner quadrant. In the 8:30 position 4 cm from the nipple a multiloculated cystic mass was visualized that measures 10 mm x 5 mm. The borders are regular. The width is greater than the height.

## 2018-02-20 NOTE — MR AVS SNAPSHOT
[address] [facility] [place] [address] [facility] 
[phone] Patient: Mortimer Smack 
MRN: [id number] Visit Information Date & Time Provider Department Dept. Phone Encounter #  
 2/20/2018 10:30 AM Echo Polk  [address] [id number] Your Appointments 2/27/2018  9:00 AM  
[facility] Appt Note: [facility] [place] [address] [facility] [address] [address]  
  
    
 6/7/2018 10:40 AM  
Office Visit with Rajan Reddy MD  
[facility] [address]) Appt Note: 6m fu chol [id number] [address] [phone]  
  
   
 [address] Upcoming Health Maintenance Date Due  
 PAP AKA CERVICAL CYTOLOGY 12/6/2019 DTaP/Tdap/Td series (2 - Td) 4/1/2022 Allergies as of 2/20/2018  Review Complete On: 2/20/2018 By: Echo Polk MD  
 No Known Allergies Current Immunizations  Never Reviewed Name Date Influenza Vaccine 9/28/2017, 9/22/2016, 10/23/2013 Influenza Vaccine (Quad) PF 9/14/2015 Tdap 4/1/2012 Varicella Virus Vaccine 9/12/2014 Not reviewed this visit You Were Diagnosed With   
  
 Codes Comments Abnormal ultrasound of breast    -  Primary ICD-10-CM: R92.8 ICD-9-CM: 793.89 Left breast mass     ICD-10-CM: N63.20 ICD-9-CM: 611.72 Vitals BP Pulse Temp Resp Weight(growth percentile) BMI  
 132/86 76 98.3 °F (36.8 °C) 18 196 lb (88.9 kg) 31.64 kg/m2 OB Status Smoking Status Having regular periods Never Smoker Vitals History BMI and BSA Data  Body Mass Index Body Surface Area  
 31.64 kg/m 2 2.03 m 2  
  
  
 Preferred Pharmacy Pharmacy Name Phone [facility] [address], [address] [facility] [phone] Your Updated Medication List  
  
   
This list is accurate as of: 2/20/18 11:37 AM.  Always use your most recent med list.  
  
  
  
  
 Donald Sidalex Take  by mouth. FIORICET -40 mg per tablet Generic drug:  butalbital-acetaminophen-caffeine Take 1 Tab by mouth. fluticasone 50 mcg/actuation nasal spray Commonly known as:  Delmon Pickup 2 Sprays by Both Nostrils route daily. FOLIC ACID PO Take  by mouth.  
  
 gabapentin 300 mg capsule Commonly known as:  NEURONTIN  
  
 labetalol 100 mg tablet Commonly known as:  NORMODYNE  
TAKE ONE TABLET BY MOUTH DAILY  
  
 meloxicam 7.5 mg tablet Commonly known as:  MOBIC METHOTREXATE  
by Does Not Apply route. Once weekly  
  
 multivitamin tablet Commonly known as:  ONE A DAY Take 1 Tab by mouth daily. nortriptyline 25 mg capsule Commonly known as:  PAMELOR Take 1 Cap by mouth nightly. PLAQUENIL 200 mg tablet Generic drug:  hydroxychloroquine Take 200 mg by mouth two (2) times a day. promethazine 25 mg tablet Commonly known as:  PHENERGAN Take 1 Tab by mouth every eight (8) hours as needed for Nausea. zolpidem CR 6.25 mg tablet Commonly known as:  AMBIEN CR Take 1 Tab by mouth nightly as needed for Sleep. We Performed the Following AMB POC US BREAST UNI REAL TIME WITH IMAGE LIMITED [40843 CPT(R)] Introducing [facility]! Axel Booker introduces SkilledWizard patient portal. Now you can access parts of your medical record, email your doctor's office, and request medication refills online. 1. In your internet browser, go to [email or web address]. [email or web address]/U.[email or web address]. Local News Network 2. Click on the First Time User? Click Here link in the Sign In box. You will see the New Member Sign Up page. 3. Enter your [employer] Access Code exactly as it appears below. You will not need to use this code after youve completed the sign-up process. If you do not sign up before the expiration date, you must request a new code. · [employer] Access Code: [email or web address] Expires: 3/11/2018  9:48 AM 
 
4. Enter the last four digits of your Social Security Number (xxxx) and Date of Birth (mm/dd/yyyy) as indicated and click Submit. You will be taken to the next sign-up page. 5. Create a Zeviat ID. This will be your [employer] login ID and cannot be changed, so think of one that is secure and easy to remember. 6. Create a [employer] password. You can change your password at any time. 7. Enter your Password Reset Question and Answer. This can be used at a later time if you forget your password. 8. Enter your e-mail address. You will receive e-mail notification when new information is available in [address]. 9. Click Sign Up. You can now view and download portions of your medical record. 10. Click the Download Summary menu link to download a portable copy of your medical information. If you have questions, please visit the Frequently Asked Questions section of the [employer] website. Remember, [employer] is NOT to be used for urgent needs. For medical emergencies, dial 911. Now available from your iPhone and Android! Please provide this summary of care documentation to your next provider. Your primary care clinician is listed as [address]. If you have any questions after today's visit, please call [phone].

## 2018-02-20 NOTE — PROGRESS NOTES
[facility]  General Surgery    Subjective:      HPI: The patient is a very pleasant 70-year-old female with a past medical history that is remarkable for hypertension, migraine headaches, insomnia, mixed connective tissue disease and previous right breast biopsy stereotactic. She is referred by her primary medical provider Dr. Miguel Cohn for evaluation and management of a BI-RADS 4 lesion in the lower inner quadrant of the left breast.  The patient denies any breast pain or nipple drainage or discharge or unintentional weight loss. Her previous breast biopsy was negative for malignancy. She was 15years old at menarche. She was 32years old at first live birth. She does use oral contraceptives. There is no family history of breast cancer. I reviewed the ultrasound of the left breast independently on the monitor. I agree with the finding of complex cystic mass in the 830 position of the breast 1 cm deep to the skin 4 cm from the nipple.     Patient Active Problem List    Diagnosis Date Noted    Mixed connective tissue disease ([address] 09/12/2014    HTN (hypertension) 12/28/2012    Migraine     Insomnia     Screening for cervical cancer 04/06/2010     Past Medical History:   Diagnosis Date    Insomnia     Migraine     Screening for cervical cancer 4/6/2010      Past Surgical History:   Procedure Laterality Date    BREAST SURGERY PROCEDURE UNLISTED  2014    right breast bx    HX APPENDECTOMY  7/1/09    HX CHOLECYSTECTOMY  2001    HX GYN  2012    ceserean       Family History   Problem Relation Age of Onset    Alcohol abuse Father     Diabetes Mother     Lung Disease Mother      COPD    Crohn's Disease Maternal Grandmother       Social History   Substance Use Topics    Smoking status: Never Smoker    Smokeless tobacco: Never Used    Alcohol use 0.0 oz/week     0 Standard drinks or equivalent per week      Comment: occasionally      No Known Allergies    Prior to Admission medications    Medication Sig Start Date End Date Taking? Authorizing Provider   gabapentin (NEURONTIN) 300 mg capsule  12/2/17  Yes Historical Provider   zolpidem CR (AMBIEN CR) 6.25 mg tablet Take 1 Tab by mouth nightly as needed for Sleep. 1/24/18  Yes Shira Mccallum MD   labetalol (NORMODYNE) 100 mg tablet TAKE ONE TABLET BY MOUTH DAILY 12/11/17  Yes Shira Mccallum MD   nortriptyline (PAMELOR) 25 mg capsule Take 1 Cap by mouth nightly. 12/11/17  Yes Shira Mccallum MD   promethazine (PHENERGAN) 25 mg tablet Take 1 Tab by mouth every eight (8) hours as needed for Nausea. 12/11/17  Yes Shira Mccallum MD   LORATADINE (CLARITIN PO) Take  by mouth. Yes Historical Provider   meloxicam (MOBIC) 7.5 mg tablet  4/10/17  Yes Historical Provider   FOLIC ACID PO Take  by mouth. Yes Historical Provider   fluticasone (FLONASE) 50 mcg/actuation nasal spray 2 Sprays by Both Nostrils route daily. 9/14/15  Yes Diannia Aaliyha, DO   multivitamin (ONE A DAY) tablet Take 1 Tab by mouth daily. Yes Historical Provider   METHOTREXATE by Does Not Apply route. Once weekly   Yes Historical Provider   hydroxychloroquine (PLAQUENIL) 200 mg tablet Take 200 mg by mouth two (2) times a day. 4/6/10  Yes Historical Provider   butalbital-acetaminophen-caffeine (FIORICET) -40 mg per tablet Take 1 Tab by mouth. Yes Historical Provider       Review of Systems:    14 systems were reviewed. The results are as above in the HPI and otherwise negative. Objective:       Physical Exam:  GENERAL: alert, cooperative, no distress, appears stated age,   EYE: conjunctivae/corneas clear. PERRL, EOM's intact.  Fundi benign,  THROAT & NECK: normal and no erythema or exudates noted. ,    LYMPHATIC: Cervical, supraclavicular, and axillary nodes normal. ,   LUNG: clear to auscultation bilaterally,   HEART: regular rate and rhythm, S1, S2 normal, no murmur, click, rub or gallop  BREAST: The breast are symmetrical.  No dimpling, retractions, skin changes or nipple retractions are visible. No axillary of supraclavicular lymphadenopathy bilaterally. No nipple retraction or discharge bilaterally. No breast masses bilaterally. ABDOMEN: soft, non-tender. Bowel sounds normal. No masses,  no organomegaly,  EXTREMITIES:  extremities normal, atraumatic, no cyanosis or edema,   SKIN: Normal.,   NEUROLOGIC: AOx3. Gait normal. Reflexes and motor strength normal and symmetric. Cranial nerves 2-12 and sensation grossly intact. ,     Data Review: I reviewed the left breast US independently on the monitor    Impression:     · Patient with a BI-RADS 4 cystic lesion in the lower inner quadrant of the left breast.    Plan:     · Ultrasound of the left breast was performed in the office. I could visualize the mass in the lower inner quadrant. See ultrasound report.   · The patient will return to see us soon for biopsy of the abnormality in the left breast.    Signed By: Mey Guevara MD     February 20, 2018

## 2018-02-27 ENCOUNTER — OFFICE VISIT (OUTPATIENT)
Dept: SURGERY | Age: 48
End: 2018-02-27

## 2018-02-27 ENCOUNTER — HOSPITAL ENCOUNTER (OUTPATIENT)
Dept: LAB | Age: 48
Discharge: HOME | End: 2018-02-27
Payer: COMMERCIAL

## 2018-02-27 VITALS
BODY MASS INDEX: 31.96 KG/M2 | SYSTOLIC BLOOD PRESSURE: 124 MMHG | RESPIRATION RATE: 18 BRPM | DIASTOLIC BLOOD PRESSURE: 84 MMHG | TEMPERATURE: 97.6 F | HEART RATE: 77 BPM | WEIGHT: 198 LBS

## 2018-02-27 DIAGNOSIS — N63.20 LEFT BREAST MASS: ICD-10-CM

## 2018-02-27 DIAGNOSIS — N63.20 LEFT BREAST MASS: Primary | ICD-10-CM

## 2018-02-27 DIAGNOSIS — R92.8 ABNORMAL ULTRASOUND OF BREAST: Primary | ICD-10-CM

## 2018-02-27 PROCEDURE — 88305 TISSUE EXAM BY PATHOLOGIST: CPT | Performed by: SURGERY

## 2018-02-27 NOTE — PROGRESS NOTES
[facility]  OFFICE PROCEDURE PROGRESS NOTE        Chart reviewed for the following:   Margareth Raymundo MD, have reviewed the History, Physical and updated the Allergic reactions for Marj Olivera performed immediately prior to start of procedure:   Margareth Raymundo MD, have performed the following reviews on Damir Alexander prior to the start of the procedure:            * Patient was identified by name and date of birth   * Agreement on procedure being performed was verified  * Risks and Benefits explained to the patient  * Procedure site verified and marked as necessary  * Patient was positioned for comfort  * Consent was signed and verified     Time: 0935 hrs      Date of procedure: 2/27/2018    Procedure performed by:  Santi Acuna MD    Provider assisted by: Fredis Alcantara LPN    Patient assisted by: self    How tolerated by patient: tolerated the procedure well with no complications    Post Procedural Pain Scale: 0 - No Hurt    Comments: none

## 2018-02-27 NOTE — PATIENT INSTRUCTIONS
Stereotactic Breast Biopsy: About This Test  What is it? Stereotactic breast biopsy is a test that uses imaging, such as X-ray, to find an area of your breast where a tissue sample will be taken. The sample is looked at under a microscope to check for signs of breast cancer. Why is this test done? This type of breast biopsy is usually done to check for cancer in a lump found during a mammogram.  How can you prepare for the test?  Talk to your doctor about all your health conditions before the test. For example, tell your doctor if you:  · Are taking any medicines. · Are allergic to any medicines. · Are allergic to latex. · Have had bleeding problems, or if you take aspirin or some other blood thinner. · Are or might be pregnant. · Have a problem with lying on your stomach for 30 to 40 minutes. What happens before the test?  · You will take off your clothing above the waist. A paper or cloth gown will cover your shoulders. · Your skin is washed with a special soap. · You will be given a shot of medicine to numb the biopsy area on your breast.  · Images are taken to find the exact site for the biopsy. What happens during the test?  · You will lie on your stomach on a table that has a hole for your breast to hang through. · Once the area in your breast is numb, a small cut (incision) is made in the skin. · Using the imaging, the doctor will guide the needle into the biopsy area. · A sample of breast tissue is taken through the needle. · A small clip is usually inserted into your breast to pierre the biopsy site. · The needle is removed and pressure put on the needle site to stop any bleeding. · A bandage is put on the needle site. What else should you know about the test?  · The small cut for the needle does not usually need stitches. How long does the test take? · The test will take about 60 minutes. Most of the time is spent preparing for the images and finding the area for the biopsy.   What happens after the test?  · You'll be told how long it may take to get your results back. · You will probably be able to go home right away. · You can go back to your usual activities right away, but avoid heavy lifting for 24 hours. · The site may be tender for 2 or 3 days. You may also have some bruising, swelling, or slight bleeding. ¨ You can use an ice pack. Put ice or a cold pack on the area for 10 to 20 minutes at a time. Put a thin cloth between the ice and your skin. ¨ Ask your doctor if you can take an over-the-counter pain medicine, such as acetaminophen (Tylenol), ibuprofen (Advil, Motrin), or naproxen (Aleve). Be safe with medicines. Read and follow all instructions on the label. · After a specialist looks at the biopsy sample for signs of cancer, your doctor's office will let you know the results. · If the test results are not clear, you may have another biopsy or test.  Follow-up care is a key part of your treatment and safety. Be sure to make and go to all appointments, and call your doctor if you are having problems. It's also a good idea to keep a list of the medicines you take. Ask your doctor when you can expect to have your test results. Where can you learn more? Go to http://paco-saturnino.info/. Enter Y286 in the search box to learn more about \"Stereotactic Breast Biopsy: About This Test.\"  Current as of: May 12, 2017  Content Version: 11.4  © 9806-6081 [employer]. Care instructions adapted under license by [employer] (which disclaims liability or warranty for this information). If you have questions about a medical condition or this instruction, always ask your healthcare professional. [address] any warranty or liability for your use of this information.

## 2018-02-27 NOTE — PROGRESS NOTES
[facility]  General Surgery    Marj Kaufman    2/27/2018      Preoperative Dx: Suspicious mass left breast    Postoperative Dx:  Same    Procedure:  Ultrasound guided mammatome biopsy of the left breast    Surgeon:  Marivel Lau    Assistant:  Fredis Alcantara LPN    Anesthesia:  Local - 1% lidocaine with epinephrine    Findings:  Breast mass    Specimen:  Core biopsy specimens of  breast mass    EBL:  5 mL    Complications:  None    Brief Operative Indication:  Suspicious mass left breast    Description of Operation:  Informed consent was obtained from the patient. Time out was performed to insure correct site surgery. The mass in the breast was identified at the 8:30 position approximately 4 cm from the nipple. It measured 10  mm x   5 mm. The breast was prepped with betadine solution. Local anesthetic was infiltrated into the skin and breast tissue. The 10 gauge rotating biopsy needle was inserted under ultrasound guidance. Multiple cores of tissue were obtained. A clip was inserted under ultrasound guidance. Pressure was held at the biopsy site to obtain hemostasis. A dressing was placed. The patient tolerated the procedure very well. Disposition:  She was stable upon exiting the office.

## 2018-03-09 ENCOUNTER — OFFICE VISIT (OUTPATIENT)
Dept: FAMILY MEDICINE CLINIC | Age: 48
End: 2018-03-09

## 2018-03-09 VITALS
TEMPERATURE: 97 F | OXYGEN SATURATION: 96 % | RESPIRATION RATE: 18 BRPM | WEIGHT: 199 LBS | SYSTOLIC BLOOD PRESSURE: 140 MMHG | DIASTOLIC BLOOD PRESSURE: 84 MMHG | HEART RATE: 71 BPM | BODY MASS INDEX: 31.98 KG/M2 | HEIGHT: 66 IN

## 2018-03-09 DIAGNOSIS — R21 RASH AND NONSPECIFIC SKIN ERUPTION: Primary | ICD-10-CM

## 2018-03-09 RX ORDER — PREDNISONE 20 MG/1
TABLET ORAL
Qty: 21 TAB | Refills: 0 | Status: SHIPPED | OUTPATIENT
Start: 2018-03-09 | End: 2018-06-06 | Stop reason: ALTCHOICE

## 2018-03-09 RX ORDER — AMITRIPTYLINE HYDROCHLORIDE 25 MG/1
TABLET, FILM COATED ORAL
COMMUNITY
Start: 2018-02-09 | End: 2018-12-20 | Stop reason: ALTCHOICE

## 2018-03-09 RX ORDER — TRIAMCINOLONE ACETONIDE 1 MG/G
CREAM TOPICAL
Qty: 15 G | Refills: 0 | Status: SHIPPED | OUTPATIENT
Start: 2018-03-09 | End: 2020-07-23 | Stop reason: ALTCHOICE

## 2018-03-09 RX ORDER — FOLIC ACID 1 MG/1
TABLET ORAL
COMMUNITY
Start: 2018-02-22 | End: 2018-03-09 | Stop reason: SDUPTHER

## 2018-03-09 RX ORDER — METHOTREXATE 2.5 MG/1
TABLET ORAL
COMMUNITY
Start: 2018-02-09

## 2018-03-09 NOTE — PROGRESS NOTES
Chief Complaint   Patient presents with    Skin Problem     itching on both feet    Tingling     tingling on both feet     1. Have you been to the ER, urgent care clinic since your last visit? Hospitalized since your last visit? No    2. Have you seen or consulted any other health care providers outside of the [address] [place] [address] since your last visit? Include any pap smears or colon screening.  No

## 2018-03-09 NOTE — MR AVS SNAPSHOT
[facility] [address] [facility] 
[phone] Patient: Julia Geiger 
MRN: [id number] Visit Information Date & Time Provider Department Dept. Phone Encounter #  
 3/9/2018  2:00 PM [facility] [phone] [id number] Your Appointments 3/13/2018  9:00 AM  
Follow Up with Nina Kaufman MD  
[facility] ([facility]) Appt Note: follow up to bx  
 [address] [address]  
  
    
 6/7/2018 10:40 AM  
Office Visit with José Mccormack MD  
[facility]) Appt Note: 6m fu chol [address] [address] [address] [address]  
[phone]  
  
   
 [address] Upcoming Health Maintenance Date Due  
 PAP AKA CERVICAL CYTOLOGY 12/6/2019 DTaP/Tdap/Td series (2 - Td) 4/1/2022 Allergies as of 3/9/2018  Review Complete On: 3/9/2018 By: Charlie Butler NP No Known Allergies Current Immunizations  Never Reviewed Name Date Influenza Vaccine 9/28/2017, 9/22/2016, 10/23/2013 Influenza Vaccine (Quad) PF 9/14/2015 Tdap 4/1/2012 Varicella Virus Vaccine 9/12/2014 Not reviewed this visit You Were Diagnosed With   
  
 Codes Comments Rash and nonspecific skin eruption    -  Primary ICD-10-CM: R21 
ICD-9-CM: 782.1 Vitals BP Pulse Temp Resp Height(growth percentile) Weight(growth percentile) 140/84 (BP 1 Location: Left arm, BP Patient Position: Sitting) 71 97 °F (36.1 °C) (Oral) 18 5' 6\" (1.676 m) 199 lb (90.3 kg) SpO2 BMI OB Status Smoking Status 96% 32.12 kg/m2 Having regular periods Never Smoker BMI and BSA Data  Body Mass Index Body Surface Area  
 32.12 kg/m 2 2.05 m 2  
  
  
 Preferred Pharmacy Pharmacy Name Phone [facility] [facility] [phone] Your Updated Medication List  
  
   
This list is accurate as of 3/9/18  2:27 PM.  Always use your most recent med list.  
  
  
  
  
 amitriptyline 25 mg tablet Commonly known as:  ELAVIL CLARITIN PO Take  by mouth. FIORICET -40 mg per tablet Generic drug:  butalbital-acetaminophen-caffeine Take 1 Tab by mouth. fluticasone 50 mcg/actuation nasal spray Commonly known as:  Broseley Goody 2 Sprays by Both Nostrils route daily. FOLIC ACID PO Take  by mouth.  
  
 gabapentin 300 mg capsule Commonly known as:  NEURONTIN  
  
 labetalol 100 mg tablet Commonly known as:  NORMODYNE  
TAKE ONE TABLET BY MOUTH DAILY  
  
 meloxicam 7.5 mg tablet Commonly known as:  MOBIC  
  
 methotrexate 2.5 mg tablet Commonly known as:  RHEUMATREX  
  
 multivitamin tablet Commonly known as:  ONE A DAY Take 1 Tab by mouth daily. nortriptyline 25 mg capsule Commonly known as:  PAMELOR Take 1 Cap by mouth nightly. PLAQUENIL 200 mg tablet Generic drug:  hydroxychloroquine Take 200 mg by mouth two (2) times a day. predniSONE 20 mg tablet Commonly known as:  Cheryl Hare Take 3 tabs daily for 3 days, then 2 tabs daily for 3 days, then 1 tab daily for 3 days, then 1/2 tab daily for 3 days. promethazine 25 mg tablet Commonly known as:  PHENERGAN Take 1 Tab by mouth every eight (8) hours as needed for Nausea. triamcinolone acetonide 0.1 % topical cream  
Commonly known as:  KENALOG Apply  to affected area every twelve (12) hours as needed for Skin Irritation. use thin layer  
  
 zolpidem CR 6.25 mg tablet Commonly known as:  AMBIEN CR Take 1 Tab by mouth nightly as needed for Sleep. Prescriptions Sent to Pharmacy  Refills  
 predniSONE (DELTASONE) 20 mg tablet 0  
 Sig: Take 3 tabs daily for 3 days, then 2 tabs daily for 3 days, then 1 tab daily for 3 days, then 1/2 tab daily for 3 days. Class: Normal  
 Pharmacy: [facility] [address] [place] [address] [facility] Ph #: [phone]  
 triamcinolone acetonide (KENALOG) 0.1 % topical cream 0 Sig: Apply  to affected area every twelve (12) hours as needed for Skin Irritation. use thin layer Class: Normal  
 Pharmacy: [facility] [address] [place] [address] Ph #: [phone] Route: Topical  
  
Patient Instructions Rash: Care Instructions Your Care Instructions A rash is any irritation or inflammation of the skin. Rashes have many possible causes, including allergy, infection, illness, heat, and emotional stress. Follow-up care is a key part of your treatment and safety. Be sure to make and go to all appointments, and call your doctor if you are having problems. It's also a good idea to know your test results and keep a list of the medicines you take. How can you care for yourself at home? · Wash the area with water only. Soap can make dryness and itching worse. Pat dry. · Put cold, wet cloths on the rash to reduce itching. · Keep cool, and stay out of the sun. · Leave the rash open to the air as much of the time as possible. · Sometimes petroleum jelly (Vaseline) can help relieve the discomfort caused by a rash. A moisturizing lotion, such as Cetaphil, also may help. Calamine lotion may help for rashes caused by contact with something (such as a plant or soap) that irritated the skin. Use it 3 or 4 times a day. · If your doctor prescribed a cream, use it as directed. If your doctor prescribed medicine, take it exactly as directed. · If your rash itches so badly that it interferes with your normal activities, take an over-the-counter antihistamine, such as diphenhydramine (Benadryl) or loratadine (Claritin). Read and follow all instructions on the label. When should you call for help? Call your doctor now or seek immediate medical care if: 
? · You have signs of infection, such as: 
¨ Increased pain, swelling, warmth, or redness. ¨ Red streaks leading from the area. ¨ Pus draining from the area. ¨ A fever. ? · You have joint pain along with the rash. ? Watch closely for changes in your health, and be sure to contact your doctor if: 
? · Your rash is changing or getting worse. For example, call if you have pain along with the rash, the rash is spreading, or you have new blisters. ? · You do not get better after 1 week. Where can you learn more? Go to http://paco-saturnino.info/. Enter G744 in the search box to learn more about \"Rash: Care Instructions. \" Current as of: October 13, 2016 Content Version: 11.4 © 5871-0380 [employer]. Care instructions adapted under license by [employer] (which disclaims liability or warranty for this information). If you have questions about a medical condition or this instruction, always ask your healthcare professional. [address] any warranty or liability for your use of this information. Introducing [facility] HE[facility]! Debbora Form introduces iSyndica patient portal. Now you can access parts of your medical record, email your doctor's office, and request medication refills online. 1. In your internet browser, go to [email or web address] 2. Click on the First Time User? Click Here link in the Sign In box. You will see the New Member Sign Up page. 3. Enter your i[employer] Access Code exactly as it appears below. You will not need to use this code after youve completed the sign-up process. If you do not sign up before the expiration date, you must request a new code. · iSynd[employer] Access Code: [email or web address] Expires: 3/11/2018  9:48 AM 
 
4.  Enter the last four digits of your Social Security Number (xxxx) and Date of Birth (mm/dd/yyyy) as indicated and click Submit. You will be taken to the next sign-up page. 5. Create a [employer] ID. This will be your [employer] login ID and cannot be changed, so think of one that is secure and easy to remember. 6. Create a [employer] password. You can change your password at any time. 7. Enter your Password Reset Question and Answer. This can be used at a later time if you forget your password. 8. Enter your e-mail address. You will receive e-mail notification when new information is available in [address]. 9. Click Sign Up. You can now view and download portions of your medical record. 10. Click the Download Summary menu link to download a portable copy of your medical information. If you have questions, please visit the Frequently Asked Questions section of the [employer] website. Remember, [employer] is NOT to be used for urgent needs. For medical emergencies, dial 911. Now available from your iPhone and Android! Please provide this summary of care documentation to your next provider. Your primary care clinician is listed as [address]. If you have any questions after today's visit, please call [phone].

## 2018-03-09 NOTE — PROGRESS NOTES
OFFICE NOTE    Shasta Hernandez is a 52 y.o. female presenting today for office visit. 3/9/2018  2:12 PM      Chief Complaint   Patient presents with    Skin Problem     itching on both feet    Tingling     tingling on both feet         HPI: Here today for itching and tingling of feet. PCP Keith Scanlon MD. Reports that she was in [facility] recently- just got back today. She began having itching and a rash on both feet up onto her leg and a spot on her arm for the past day. She does feel some tingling in her feet as well. Reports doing a lot of walking while on vacation and this may have contributed. Denies any contact with any new things that she is aware of; however, she was on vacation in a new area. She has tried a warm shower- did not help much. Review of Systems   Constitutional: Negative for chills, fatigue and fever. HENT: Negative for sore throat and trouble swallowing. Respiratory: Negative for cough, chest tightness, shortness of breath and wheezing. Musculoskeletal: Negative for arthralgias and joint swelling. Skin: Positive for rash. Neurological: Negative for numbness. +tingling         PHQ Screening   PHQ over the last two weeks 3/9/2018   Little interest or pleasure in doing things Not at all   Feeling down, depressed or hopeless Not at all   Total Score PHQ 2 0         History  Past Medical History:   Diagnosis Date    Insomnia     Migraine     Screening for cervical cancer 4/6/2010       Past Surgical History:   Procedure Laterality Date    BREAST SURGERY PROCEDURE UNLISTED  2014    right breast bx    HX APPENDECTOMY  7/1/09    HX CHOLECYSTECTOMY  2001    HX GYN  2012    ceserean        Social History     Social History    Marital status:      Spouse name: N/A    Number of children: N/A    Years of education: N/A     Occupational History    Not on file.      Social History Main Topics    Smoking status: Never Smoker    Smokeless tobacco: Never Used  Alcohol use 0.0 oz/week     0 Standard drinks or equivalent per week      Comment: occasionally    Drug use: No    Sexual activity: Yes     Partners: Male     Birth control/ protection: None     Other Topics Concern    Caffeine Concern Yes     2 cans daily     Exercise Yes     cardio and weight training 3 to 4 times a week   Cashion Yes     Social History Narrative    Safety issues/concerns: ( none)Domestic Violence, (none)Guns in home,(use)Sunscreen, (working)Smoke Detectors, (safe)Housing. No Known Allergies    Current Outpatient Prescriptions   Medication Sig Dispense Refill    amitriptyline (ELAVIL) 25 mg tablet       methotrexate (RHEUMATREX) 2.5 mg tablet       gabapentin (NEURONTIN) 300 mg capsule       zolpidem CR (AMBIEN CR) 6.25 mg tablet Take 1 Tab by mouth nightly as needed for Sleep. 30 Tab 2    labetalol (NORMODYNE) 100 mg tablet TAKE ONE TABLET BY MOUTH DAILY 30 Tab 2    nortriptyline (PAMELOR) 25 mg capsule Take 1 Cap by mouth nightly. 30 Cap 6    promethazine (PHENERGAN) 25 mg tablet Take 1 Tab by mouth every eight (8) hours as needed for Nausea. 30 Tab 2    LORATADINE (CLARITIN PO) Take  by mouth.  meloxicam (MOBIC) 7.5 mg tablet       FOLIC ACID PO Take  by mouth.  fluticasone (FLONASE) 50 mcg/actuation nasal spray 2 Sprays by Both Nostrils route daily. 1 Bottle 3    multivitamin (ONE A DAY) tablet Take 1 Tab by mouth daily.  hydroxychloroquine (PLAQUENIL) 200 mg tablet Take 200 mg by mouth two (2) times a day.  butalbital-acetaminophen-caffeine (FIORICET) -40 mg per tablet Take 1 Tab by mouth.            Patient Care Team:  Patient Care Team:  Gabrielle Hart MD as PCP - Tavon Corral RN as Nurse Frank Oliver MD (Rheumatology)  Miriam Murphy MD as Surgeon (General Surgery)        LABS:  None new to review    RADIOLOGY:  None new to review      Physical Exam   Constitutional: She is oriented to person, place, and time. She appears well-developed and well-nourished. No distress. Pulmonary/Chest: Effort normal. No respiratory distress. Musculoskeletal: She exhibits no edema. Neurological: She is alert and oriented to person, place, and time. She exhibits normal muscle tone. Coordination normal.   Skin: Skin is warm and dry. Rash noted. Rash is maculopapular and urticarial.        Psychiatric: Her speech is normal and behavior is normal. Her mood appears not anxious. She does not exhibit a depressed mood. Clinical Photos                    Vitals:    03/09/18 1407   BP: 140/84   Pulse: 71   Resp: 18   Temp: 97 °F (36.1 °C)   TempSrc: Oral   SpO2: 96%   Weight: 199 lb (90.3 kg)   Height: 5' 6\" (1.676 m)   PainSc:   0 - No pain         Assessment and Plan    Rash and nonspecific skin eruption  *Discussed with patient about possible causes. Appears to be allergic in nature- will start on PO steroid with PRN topical steroid. May use Calamine lotion and PRN Benadryl. Cool compresses or soaks would benefit as well. - predniSONE (DELTASONE) 20 mg tablet; Take 3 tabs daily for 3 days, then 2 tabs daily for 3 days, then 1 tab daily for 3 days, then 1/2 tab daily for 3 days. Dispense: 21 Tab; Refill: 0  - triamcinolone acetonide (KENALOG) 0.1 % topical cream; Apply  to affected area every twelve (12) hours as needed for Skin Irritation. use thin layer  Dispense: 15 g; Refill: 0      *Plan of care reviewed with patient. Patient in agreement with plan and expresses understanding. All questions answered and patient encouraged to call or RTO if further questions or concerns. Follow-up Disposition:  Return if symptoms worsen or fail to improve.

## 2018-03-09 NOTE — PATIENT INSTRUCTIONS
Rash: Care Instructions  Your Care Instructions  A rash is any irritation or inflammation of the skin. Rashes have many possible causes, including allergy, infection, illness, heat, and emotional stress. Follow-up care is a key part of your treatment and safety. Be sure to make and go to all appointments, and call your doctor if you are having problems. It's also a good idea to know your test results and keep a list of the medicines you take. How can you care for yourself at home? · Wash the area with water only. Soap can make dryness and itching worse. Pat dry. · Put cold, wet cloths on the rash to reduce itching. · Keep cool, and stay out of the sun. · Leave the rash open to the air as much of the time as possible. · Sometimes petroleum jelly (Vaseline) can help relieve the discomfort caused by a rash. A moisturizing lotion, such as Cetaphil, also may help. Calamine lotion may help for rashes caused by contact with something (such as a plant or soap) that irritated the skin. Use it 3 or 4 times a day. · If your doctor prescribed a cream, use it as directed. If your doctor prescribed medicine, take it exactly as directed. · If your rash itches so badly that it interferes with your normal activities, take an over-the-counter antihistamine, such as diphenhydramine (Benadryl) or loratadine (Claritin). Read and follow all instructions on the label. When should you call for help? Call your doctor now or seek immediate medical care if:  ? · You have signs of infection, such as:  ¨ Increased pain, swelling, warmth, or redness. ¨ Red streaks leading from the area. ¨ Pus draining from the area. ¨ A fever. ? · You have joint pain along with the rash. ? Watch closely for changes in your health, and be sure to contact your doctor if:  ? · Your rash is changing or getting worse. For example, call if you have pain along with the rash, the rash is spreading, or you have new blisters.    ? · You do not get better after 1 week. Where can you learn more? Go to http://paco-saturnino.info/. Enter W464 in the search box to learn more about \"Rash: Care Instructions. \"  Current as of: October 13, 2016  Content Version: 11.4  © 5946-0958 [employer]. Care instructions adapted under license by [employer] (which disclaims liability or warranty for this information). If you have questions about a medical condition or this instruction, always ask your healthcare professional. [address] any warranty or liability for your use of this information.

## 2018-03-13 ENCOUNTER — OFFICE VISIT (OUTPATIENT)
Dept: SURGERY | Age: 48
End: 2018-03-13

## 2018-03-13 VITALS
TEMPERATURE: 96 F | WEIGHT: 201 LBS | DIASTOLIC BLOOD PRESSURE: 84 MMHG | RESPIRATION RATE: 16 BRPM | BODY MASS INDEX: 32.3 KG/M2 | HEIGHT: 66 IN | SYSTOLIC BLOOD PRESSURE: 132 MMHG | HEART RATE: 69 BPM | OXYGEN SATURATION: 97 %

## 2018-03-13 DIAGNOSIS — N60.12 FIBROCYSTIC CHANGE OF BREAST, LEFT: Primary | ICD-10-CM

## 2018-03-13 NOTE — MR AVS SNAPSHOT
[address] 240 706 [facility] 
[phone] Patient: Lorna Shaffer 
MRN: [id number] Visit Information Date & Time Provider Department Dept. Phone Encounter #  
 3/13/2018  9:00 AM Tiff Mullins  [address] [id number] Follow-up Instructions Return for Review mammogram, Clinical breast examination. Follow-up and Disposition History Your Appointments 6/7/2018 10:40 AM  
Office Visit with Thomas Aguilar MD  
[facility] 48 [facility]) Appt Note: 6m fu chol [address] [address] [facility] [address]  
[phone]  
  
   
 [address] [facility]  
  
    
 9/28/2018 10:00 AM  
Follow Up with Tiff Mullins MD  
[facility] ([facility]) Appt Note: 6 month f/up / 5324 [facility] 240 [address] [address] [facility] Upcoming Health Maintenance Date Due  
 PAP AKA CERVICAL CYTOLOGY 12/6/2019 DTaP/Tdap/Td series (2 - Td) 4/1/2022 Allergies as of 3/13/2018  Review Complete On: 3/13/2018 By: Tiff Mullins MD  
 No Known Allergies Current Immunizations  Never Reviewed Name Date Influenza Vaccine 9/28/2017, 9/22/2016, 10/23/2013 Influenza Vaccine (Quad) PF 9/14/2015 Tdap 4/1/2012 Varicella Virus Vaccine 9/12/2014 Not reviewed this visit You Were Diagnosed With   
  
 Codes Comments Fibrocystic change of breast, left    -  Primary ICD-10-CM: N60.12 ICD-9-CM: 610.1 Vitals BP Pulse Temp Resp Height(growth percentile) Weight(growth percentile) 132/84 69 96 °F (35.6 °C) (Oral) 16 5' 6\" (1.676 m) 201 lb (91.2 kg) SpO2 BMI OB Status Smoking Status 97% 32.44 kg/m2 Having regular periods Never Smoker BMI and BSA Data Body Mass Index Body Surface Area  
 32.44 kg/m 2 2.06 m 2 Preferred Pharmacy Pharmacy Name Phone OSMIN MILAN [facility] [address] [facility] [phone] Your Updated Medication List  
  
   
This list is accurate as of 3/13/18  9:40 AM.  Always use your most recent med list.  
  
  
  
  
 amitriptyline 25 mg tablet Commonly known as:  ELAVIL CLARITIN PO Take  by mouth. FIORICET -40 mg per tablet Generic drug:  butalbital-acetaminophen-caffeine Take 1 Tab by mouth. fluticasone 50 mcg/actuation nasal spray Commonly known as:  Ada Goody 2 Sprays by Both Nostrils route daily. FOLIC ACID PO Take  by mouth.  
  
 gabapentin 300 mg capsule Commonly known as:  NEURONTIN  
  
 labetalol 100 mg tablet Commonly known as:  NORMODYNE  
TAKE ONE TABLET BY MOUTH DAILY  
  
 meloxicam 7.5 mg tablet Commonly known as:  MOBIC  
  
 methotrexate 2.5 mg tablet Commonly known as:  RHEUMATREX  
  
 multivitamin tablet Commonly known as:  ONE A DAY Take 1 Tab by mouth daily. nortriptyline 25 mg capsule Commonly known as:  PAMELOR Take 1 Cap by mouth nightly. PLAQUENIL 200 mg tablet Generic drug:  hydroxychloroquine Take 200 mg by mouth two (2) times a day. predniSONE 20 mg tablet Commonly known as:  Cheryl Hare Take 3 tabs daily for 3 days, then 2 tabs daily for 3 days, then 1 tab daily for 3 days, then 1/2 tab daily for 3 days. promethazine 25 mg tablet Commonly known as:  PHENERGAN Take 1 Tab by mouth every eight (8) hours as needed for Nausea. triamcinolone acetonide 0.1 % topical cream  
Commonly known as:  KENALOG Apply  to affected area every twelve (12) hours as needed for Skin Irritation. use thin layer  
  
 zolpidem CR 6.25 mg tablet Commonly known as:  AMBIEN BESSIE  
 Take 1 Tab by mouth nightly as needed for Sleep. Follow-up Instructions Return for Review mammogram, Clinical breast examination. To-Do List   
 03/13/2018 Imaging:  AR[facility] 3D [place] W MAMMO LT DX INCL CAD Introducing [facility] [place] [facility]! Andrey Barrios introduces Magic Wheels patient portal. Now you can access parts of your medical record, email your doctor's office, and request medication refills online. 1. In your internet browser, go to [email or web address] 2. Click on the First Time User? Click Here link in the Sign In box. You will see the New Member Sign Up page. 3. Enter your Magic Wheels Access Code exactly as it appears below. You will not need to use this code after youve completed the sign-up process. If you do not sign up before the expiration date, you must request a new code. · Magic Wheels Access Code: [email or web address] Expires: 6/11/2018  8:52 AM 
 
4. Enter the last four digits of your Social Security Number (xxxx) and Date of Birth (mm/dd/yyyy) as indicated and click Submit. You will be taken to the next sign-up page. 5. Create a Magic Wheels ID. This will be your Magic Wheels login ID and cannot be changed, so think of one that is secure and easy to remember. 6. Create a Magic Wheels password. You can change your password at any time. 7. Enter your Password Reset Question and Answer. This can be used at a later time if you forget your password. 8. Enter your e-mail address. You will receive e-mail notification when new information is available in [address] 9. Click Sign Up. You can now view and download portions of your medical record. 10. Click the Download Summary menu link to download a portable copy of your medical information. If you have questions, please visit the Frequently Asked Questions section of the Magic Wheels website. Remember, Magic Wheels is NOT to be used for urgent needs. For medical emergencies, dial 911. Now available from your iPhone and Android! Please provide this summary of care documentation to your next provider. Your primary care clinician is listed as [address]. If you have any questions after today's visit, please call [phone].

## 2018-03-13 NOTE — PROGRESS NOTES
[facility]  General Surgery    Name: Kelvin Castillo MRN: [id number]   : 1970 Hospital: [facility]   Date: 3/13/2018 Admission Date: No admission date for patient encounter. Subjective:  Patient returns today without complaints. She states that she had no pain from the biopsy. She did have some bruising which is resolving. We discussed the negative results of her biopsy which only revealed fibrocystic disease and inflammatory changes. No malignancy or premalignant findings. She will follow with us every 6 months for the next 18 months with mammograms and clinical exam to evaluate for stability of the area in the left breast.  Objective:  Vitals:    18 0909   BP: 132/84   Pulse: 69   Resp: 16   Temp: 96 °F (35.6 °C)   TempSrc: Oral   SpO2: 97%   Weight: 91.2 kg (201 lb)   Height: 5' 6\" (1.676 m)       Physical Exam:    General: Awake and alert, oriented ×4, no apparent distress   BREASTS:    Left:  No dimpling, nipple inversion or retractions. Upper inner quadrant bruising with  yellowish purple discoloration   No axillary or supraclavicular lymphadenopathy. Current Medications:  Current Outpatient Prescriptions   Medication Sig Dispense Refill    amitriptyline (ELAVIL) 25 mg tablet       methotrexate (RHEUMATREX) 2.5 mg tablet       predniSONE (DELTASONE) 20 mg tablet Take 3 tabs daily for 3 days, then 2 tabs daily for 3 days, then 1 tab daily for 3 days, then 1/2 tab daily for 3 days. 21 Tab 0    triamcinolone acetonide (KENALOG) 0.1 % topical cream Apply  to affected area every twelve (12) hours as needed for Skin Irritation. use thin layer 15 g 0    gabapentin (NEURONTIN) 300 mg capsule       zolpidem CR (AMBIEN CR) 6.25 mg tablet Take 1 Tab by mouth nightly as needed for Sleep. 30 Tab 2    labetalol (NORMODYNE) 100 mg tablet TAKE ONE TABLET BY MOUTH DAILY 30 Tab 2    nortriptyline (PAMELOR) 25 mg capsule Take 1 Cap by mouth nightly.  30 Cap 6    promethazine (PHENERGAN) 25 mg tablet Take 1 Tab by mouth every eight (8) hours as needed for Nausea. 30 Tab 2    LORATADINE (CLARITIN PO) Take  by mouth.  meloxicam (MOBIC) 7.5 mg tablet       FOLIC ACID PO Take  by mouth.  fluticasone (FLONASE) 50 mcg/actuation nasal spray 2 Sprays by Both Nostrils route daily. 1 Bottle 3    multivitamin (ONE A DAY) tablet Take 1 Tab by mouth daily.  hydroxychloroquine (PLAQUENIL) 200 mg tablet Take 200 mg by mouth two (2) times a day.  butalbital-acetaminophen-caffeine (FIORICET) -40 mg per tablet Take 1 Tab by mouth. Chart and notes reviewed. Data reviewed. I have evaluated and examined the patient. IMPRESSION:   · Patient with fibrocystic disease and inflammatory changes in the left breast without any evidence of premalignant or malignant findings.       PLAN:/DISCUSION:   · She will follow with us at six-month intervals over the next 18 months until we establish stability of the left breast.  · Continue monthly self breast exam  · Left 3D diagnostic mammogram in 6 months        Alisha Jason MD

## 2018-04-07 DIAGNOSIS — F51.01 PRIMARY INSOMNIA: ICD-10-CM

## 2018-04-11 RX ORDER — ZOLPIDEM TARTRATE 6.25 MG/1
TABLET, FILM COATED, EXTENDED RELEASE ORAL
Qty: 15 TAB | Refills: 1 | Status: SHIPPED | OUTPATIENT
Start: 2018-04-11 | End: 2018-04-25 | Stop reason: SDUPTHER

## 2018-04-18 RX ORDER — LABETALOL 100 MG/1
TABLET, FILM COATED ORAL
Qty: 30 TAB | Refills: 1 | Status: SHIPPED | OUTPATIENT
Start: 2018-04-18 | End: 2018-06-22 | Stop reason: SDUPTHER

## 2018-04-25 DIAGNOSIS — F51.01 PRIMARY INSOMNIA: ICD-10-CM

## 2018-04-25 RX ORDER — ZOLPIDEM TARTRATE 6.25 MG/1
TABLET, FILM COATED, EXTENDED RELEASE ORAL
Qty: 30 TAB | Refills: 1 | Status: SHIPPED | OUTPATIENT
Start: 2018-04-25 | End: 2018-06-06 | Stop reason: SDUPTHER

## 2018-04-25 NOTE — PROGRESS NOTES
Pt states that her pharmacy will not pay for a qty disp of #15 ( ?)  ; rx changed. Pt bought old rx back and rx was shredded.

## 2018-06-06 ENCOUNTER — OFFICE VISIT (OUTPATIENT)
Dept: FAMILY MEDICINE CLINIC | Age: 48
End: 2018-06-06

## 2018-06-06 VITALS
SYSTOLIC BLOOD PRESSURE: 142 MMHG | HEIGHT: 66 IN | RESPIRATION RATE: 16 BRPM | WEIGHT: 194 LBS | TEMPERATURE: 98.1 F | DIASTOLIC BLOOD PRESSURE: 92 MMHG | HEART RATE: 80 BPM | BODY MASS INDEX: 31.18 KG/M2 | OXYGEN SATURATION: 95 %

## 2018-06-06 DIAGNOSIS — F51.01 PRIMARY INSOMNIA: ICD-10-CM

## 2018-06-06 DIAGNOSIS — E78.00 HYPERCHOLESTEROLEMIA: Primary | ICD-10-CM

## 2018-06-06 DIAGNOSIS — J30.89 SEASONAL ALLERGIC RHINITIS DUE TO OTHER ALLERGIC TRIGGER: ICD-10-CM

## 2018-06-06 RX ORDER — PROMETHAZINE HYDROCHLORIDE 25 MG/1
25 TABLET ORAL
Qty: 90 TAB | Refills: 1 | Status: SHIPPED | OUTPATIENT
Start: 2018-06-06 | End: 2021-06-07 | Stop reason: SDUPTHER

## 2018-06-06 RX ORDER — NORTRIPTYLINE HYDROCHLORIDE 25 MG/1
25 CAPSULE ORAL
Qty: 90 CAP | Refills: 1 | Status: SHIPPED | OUTPATIENT
Start: 2018-06-06 | End: 2018-12-20 | Stop reason: SDUPTHER

## 2018-06-06 RX ORDER — FLUTICASONE PROPIONATE 50 MCG
2 SPRAY, SUSPENSION (ML) NASAL DAILY
Qty: 3 BOTTLE | Refills: 1 | Status: SHIPPED | OUTPATIENT
Start: 2018-06-06 | End: 2019-05-20 | Stop reason: SDUPTHER

## 2018-06-06 RX ORDER — ZOLPIDEM TARTRATE 6.25 MG/1
TABLET, FILM COATED, EXTENDED RELEASE ORAL
Qty: 30 TAB | Refills: 1 | Status: SHIPPED | OUTPATIENT
Start: 2018-06-06 | End: 2018-08-22 | Stop reason: SDUPTHER

## 2018-06-06 RX ORDER — LABETALOL 100 MG/1
TABLET, FILM COATED ORAL
Qty: 90 TAB | Refills: 1 | Status: CANCELLED | OUTPATIENT
Start: 2018-06-06

## 2018-06-06 NOTE — PROGRESS NOTES
HISTORY OF PRESENT ILLNESS  Rosamaria Villar is a 52 y.o. female. HPI  Patient is here today for evaluation and treatment of: Cholesterol    Cholesterol:  Had a work screening cholesterol level was 192. Blood sugar was also stable. She declines any new lab orders today. Initial BP elevated; Pt has had some allergy sx; She is on flonase; She needs a refill; She has chronic insomnia ; she is on [place]; she needs a refill. Current Outpatient Prescriptions:     zolpidem CR (AMBIEN CR) 6.25 mg tablet, TAKE ONE TABLET BY MOUTH EVERY NIGHT AT BEDTIME AS NEEDED FOR SLEEP, Disp: 30 Tab, Rfl: 1    nortriptyline (PAMELOR) 25 mg capsule, Take 1 Cap by mouth nightly., Disp: 90 Cap, Rfl: 1    promethazine (PHENERGAN) 25 mg tablet, Take 1 Tab by mouth every eight (8) hours as needed for Nausea., Disp: 90 Tab, Rfl: 1    fluticasone (FLONASE) 50 mcg/actuation nasal spray, 2 Sprays by Both Nostrils route daily. , Disp: 3 Bottle, Rfl: 1    labetalol (NORMODYNE) 100 mg tablet, TAKE ONE TABLET BY MOUTH DAILY, Disp: 30 Tab, Rfl: 1    amitriptyline (ELAVIL) 25 mg tablet, , Disp: , Rfl:     methotrexate (RHEUMATREX) 2.5 mg tablet, , Disp: , Rfl:     triamcinolone acetonide (KENALOG) 0.1 % topical cream, Apply  to affected area every twelve (12) hours as needed for Skin Irritation. use thin layer, Disp: 15 g, Rfl: 0    gabapentin (NEURONTIN) 300 mg capsule, , Disp: , Rfl:     LORATADINE (CLARITIN PO), Take  by mouth., Disp: , Rfl:     meloxicam (MOBIC) 7.5 mg tablet, , Disp: , Rfl:     FOLIC ACID PO, Take  by mouth., Disp: , Rfl:     multivitamin (ONE A DAY) tablet, Take 1 Tab by mouth daily. , Disp: , Rfl:     hydroxychloroquine (PLAQUENIL) 200 mg tablet, Take 200 mg by mouth two (2) times a day., Disp: , Rfl:     butalbital-acetaminophen-caffeine (FIORICET) -40 mg per tablet, Take 1 Tab by mouth., Disp: , Rfl:       PMH,  Meds, Allergies, Family History, Social history reviewed    Review of Systems Constitutional: Negative for chills and fever. Cardiovascular: Negative for chest pain and palpitations. Physical Exam   Visit Vitals    BP (!) 142/92    Pulse 80    Temp 98.1 °F (36.7 °C) (Oral)    Resp 16    Ht 5' 6\" (1.676 m)    Wt 194 lb (88 kg)    LMP 05/20/2018    SpO2 95%    BMI 31.31 kg/m2     . ASSESSMENT and PLAN    ICD-10-CM ICD-9-CM    1. Hypercholesterolemia E78.00 272.0    2. Primary insomnia F51.01 307.42 zolpidem CR (AMBIEN CR) 6.25 mg tablet   3. Seasonal allergic rhinitis due to other allergic trigger J30.89 477.8        As above,   above all stable unless otherwise noted  Labs as ordered  Continue current meds as ordered  Refilled flonase and ambien  Follow-up Disposition:  Return in about 3 months (around 9/6/2018) for BP. An After Visit Summary was printed and given to the patient. This has been fully explained to the patient, who indicates understanding.

## 2018-06-06 NOTE — MR AVS SNAPSHOT
[address] [facility] 
 
 
 [address] [place] [address] 
[phone] Patient: Anselmo Carlson 
MRN: [id number] Visit Information Date & Time Provider Department Dept. Phone Encounter #  
 6/6/2018 11:20 AM Anum Mariano, [address] [place] [address] [id number] Follow-up Instructions Return in about 3 months (around 9/6/2018) for BP. Your Appointments 9/28/2018 10:00 AM  
Follow Up with MD LAURYN [facility] ([facility]) Appt Note: 6 month f/up / 5324 [facility] 240 [address] [address] 407 [facility] [address] [facility] Upcoming Health Maintenance Date Due Influenza Age 5 to Adult 8/1/2018 PAP AKA CERVICAL CYTOLOGY 12/6/2019 DTaP/Tdap/Td series (2 - Td) 4/1/2022 Allergies as of 6/6/2018  Review Complete On: 6/6/2018 By: Anum Mariano MD  
 No Known Allergies Current Immunizations  Never Reviewed Name Date Influenza Vaccine 9/28/2017, 9/22/2016, 10/23/2013 Influenza Vaccine (Quad) PF 9/14/2015 Tdap 4/1/2012 Varicella Virus Vaccine 9/12/2014 Not reviewed this visit You Were Diagnosed With   
  
 Codes Comments Hypercholesterolemia    -  Primary ICD-10-CM: E78.00 ICD-9-CM: 272.0 Primary insomnia     ICD-10-CM: F51.01 
ICD-9-CM: 307.42 Seasonal allergic rhinitis due to other allergic trigger     ICD-10-CM: J30.89 ICD-9-CM: 477.8 Vitals BP Pulse Temp Resp Height(growth percentile) Weight(growth percentile) (!) 142/92 80 98.1 °F (36.7 °C) (Oral) 16 5' 6\" (1.676 m) 194 lb (88 kg) LMP SpO2 BMI OB Status Smoking Status 05/20/2018 95% 31.31 kg/m2 Having regular periods Never Smoker Vitals History BMI and BSA Data Body Mass Index Body Surface Area  
 31.31 kg/m 2 2.02 m 2 Preferred Pharmacy Pharmacy Name Phone Tiara Mehta, [facility] [phone] Your Updated Medication List  
  
   
This list is accurate as of 6/6/18 12:07 PM.  Always use your most recent med list.  
  
  
  
  
 amitriptyline 25 mg tablet Commonly known as:  ELAVIL CLARITIN PO Take  by mouth. FIORICET -40 mg per tablet Generic drug:  butalbital-acetaminophen-caffeine Take 1 Tab by mouth. fluticasone 50 mcg/actuation nasal spray Commonly known as:  Ellie Courser 2 Sprays by Both Nostrils route daily. FOLIC ACID PO Take  by mouth.  
  
 gabapentin 300 mg capsule Commonly known as:  NEURONTIN  
  
 labetalol 100 mg tablet Commonly known as:  NORMODYNE  
TAKE ONE TABLET BY MOUTH DAILY  
  
 meloxicam 7.5 mg tablet Commonly known as:  MOBIC  
  
 methotrexate 2.5 mg tablet Commonly known as:  RHEUMATREX  
  
 multivitamin tablet Commonly known as:  ONE A DAY Take 1 Tab by mouth daily. nortriptyline 25 mg capsule Commonly known as:  PAMELOR Take 1 Cap by mouth nightly. PLAQUENIL 200 mg tablet Generic drug:  hydroxychloroquine Take 200 mg by mouth two (2) times a day. promethazine 25 mg tablet Commonly known as:  PHENERGAN Take 1 Tab by mouth every eight (8) hours as needed for Nausea. triamcinolone acetonide 0.1 % topical cream  
Commonly known as:  KENALOG Apply  to affected area every twelve (12) hours as needed for Skin Irritation. use thin layer  
  
 zolpidem CR 6.25 mg tablet Commonly known as:  AMBIEN CR  
TAKE ONE TABLET BY MOUTH EVERY NIGHT AT BEDTIME AS NEEDED FOR SLEEP Prescriptions Printed Refills  
 zolpidem CR (AMBIEN CR) 6.25 mg tablet 1 Sig: TAKE ONE TABLET BY MOUTH EVERY NIGHT AT BEDTIME AS NEEDED FOR SLEEP Class: Print Prescriptions Sent to Pharmacy Refills  
 nortriptyline (PAMELOR) 25 mg capsule 1 Sig: Take 1 Cap by mouth nightly. Class: Normal  
 Pharmacy: [address] [facility] #: [phone] Route: Oral  
 promethazine (PHENERGAN) 25 mg tablet 1 Sig: Take 1 Tab by mouth every eight (8) hours as needed for Nausea. Class: Normal  
 Pharmacy: [address], [place] #: [phone] Route: Oral  
 fluticasone (FLONASE) 50 mcg/actuation nasal spray 1 Si Sprays by Both Nostrils route daily. Class: Normal  
 Pharmacy: [address] [facility] #: [phone] Route: Both Nostrils Follow-up Instructions Return in about 3 months (around 2018) for BP. Patient Instructions High Blood Pressure: Care Instructions Your Care Instructions If your blood pressure is usually above 140/90, you have high blood pressure, or hypertension. That means the top number is 140 or higher or the bottom number is 90 or higher, or both. Despite what a lot of people think, high blood pressure usually doesn't cause headaches or make you feel dizzy or lightheaded. It usually has no symptoms. But it does increase your risk for heart attack, stroke, and kidney or eye damage. The higher your blood pressure, the more your risk increases. Your doctor will give you a goal for your blood pressure. Your goal will be based on your health and your age. An example of a goal is to keep your blood pressure below 140/90. Lifestyle changes, such as eating healthy and being active, are always important to help lower blood pressure. You might also take medicine to reach your blood pressure goal. 
Follow-up care is a key part of your treatment and safety. Be sure to make and go to all appointments, and call your doctor if you are having problems. It's also a good idea to know your test results and keep a list of the medicines you take. How can you care for yourself at home? Medical treatment · If you stop taking your medicine, your blood pressure will go back up. You may take one or more types of medicine to lower your blood pressure. Be safe with medicines. Take your medicine exactly as prescribed. Call your doctor if you think you are having a problem with your medicine. · Talk to your doctor before you start taking aspirin every day. Aspirin can help certain people lower their risk of a heart attack or stroke. But taking aspirin isn't right for everyone, because it can cause serious bleeding. · See your doctor regularly. You may need to see the doctor more often at first or until your blood pressure comes down. · If you are taking blood pressure medicine, talk to your doctor before you take decongestants or anti-inflammatory medicine, such as ibuprofen. Some of these medicines can raise blood pressure. · Learn how to check your blood pressure at home. Lifestyle changes · Stay at a healthy weight. This is especially important if you put on weight around the waist. Losing even 10 pounds can help you lower your blood pressure. · If your doctor recommends it, get more exercise. Walking is a good choice. Bit by bit, increase the amount you walk every day. Try for at least 30 minutes on most days of the week. You also may want to swim, bike, or do other activities. · Avoid or limit alcohol. Talk to your doctor about whether you can drink any alcohol. · Try to limit how much sodium you eat to less than 2,300 milligrams (mg) a day. Your doctor may ask you to try to eat less than 1,500 mg a day. · Eat plenty of fruits (such as bananas and oranges), vegetables, legumes, whole grains, and low-fat dairy products. · Lower the amount of saturated fat in your diet. Saturated fat is found in animal products such as milk, cheese, and meat. Limiting these foods may help you lose weight and also lower your risk for heart disease. · Do not smoke. Smoking increases your risk for heart attack and stroke. If you need help quitting, talk to your doctor about stop-smoking programs and medicines. These can increase your chances of quitting for good. When should you call for help? Call 911 anytime you think you may need emergency care. This may mean having symptoms that suggest that your blood pressure is causing a serious heart or blood vessel problem. Your blood pressure may be over 180/110. ? For example, call 911 if: 
? · You have symptoms of a heart attack. These may include: ¨ Chest pain or pressure, or a strange feeling in the chest. 
¨ Sweating. ¨ Shortness of breath. ¨ Nausea or vomiting. ¨ Pain, pressure, or a strange feeling in the back, neck, jaw, or upper belly or in one or both shoulders or arms. ¨ Lightheadedness or sudden weakness. ¨ A fast or irregular heartbeat. ? · You have symptoms of a stroke. These may include: 
¨ Sudden numbness, tingling, weakness, or loss of movement in your face, arm, or leg, especially on only one side of your body. ¨ Sudden vision changes. ¨ Sudden trouble speaking. ¨ Sudden confusion or trouble understanding simple statements. ¨ Sudden problems with walking or balance. ¨ A sudden, severe headache that is different from past headaches. ? · You have severe back or belly pain. ?Do not wait until your blood pressure comes down on its own. Get help right away. ?Call your doctor now or seek immediate care if: 
? · Your blood pressure is much higher than normal (such as 180/110 or higher), but you don't have symptoms. ? · You think high blood pressure is causing symptoms, such as: ¨ Severe headache. ¨ Blurry vision. ? Watch closely for changes in your health, and be sure to contact your doctor if: 
? · Your blood pressure measures 140/90 or higher at least 2 times. That means the top number is 140 or higher or the bottom number is 90 or higher, or both. ? · You think you may be having side effects from your blood pressure medicine. ? · Your blood pressure is usually normal, but it goes above normal at least 2 times. Where can you learn more? Go to http://paco-saturnino.info/. Enter C198 in the search box to learn more about \"High Blood Pressure: Care Instructions. \" Current as of: September 21, 2016 Content Version: 11.4 © 9650-0917 [employer]. Care instructions adapted under license by [employer] (which disclaims liability or warranty for this information). If you have questions about a medical condition or this instruction, always ask your healthcare professional. [address] any warranty or liability for your use of this information. Introducing [facility] [place] [facility]! Nitza Marti introduces Ploonge patient portal. Now you can access parts of your medical record, email your doctor's office, and request medication refills online. 1. In your internet browser, go to [email or web address] 2. Click on the First Time User? Click Here link in the Sign In box. You will see the New Member Sign Up page. 3. Enter your Ploonge Access Code exactly as it appears below. You will not need to use this code after youve completed the sign-up process. If you do not sign up before the expiration date, you must request a new code. · Ploonge Access Code: [email or web address] Expires: 6/11/2018  8:52 AM 
 
4. Enter the last four digits of your Social Security Number (xxxx) and Date of Birth (mm/dd/yyyy) as indicated and click Submit. You will be taken to the next sign-up page. 5. Create a Ploonge ID. This will be your Ploonge login ID and cannot be changed, so think of one that is secure and easy to remember. 6. Create a Ploonge password. You can change your password at any time. 7. Enter your Password Reset Question and Answer.  This can be used at a later time if you forget your password. 8. Enter your e-mail address. You will receive e-mail notification when new information is available in [address] 9. Click Sign Up. You can now view and download portions of your medical record. 10. Click the Download Summary menu link to download a portable copy of your medical information. If you have questions, please visit the Frequently Asked Questions section of the Clicktree website. Remember, Clicktree is NOT to be used for urgent needs. For medical emergencies, dial 911. Now available from your iPhone and Android! Please provide this summary of care documentation to your next provider. Your primary care clinician is listed as [address]. If you have any questions after today's visit, please call [phone].

## 2018-06-06 NOTE — PATIENT INSTRUCTIONS
High Blood Pressure: Care Instructions  Your Care Instructions    If your blood pressure is usually above 140/90, you have high blood pressure, or hypertension. That means the top number is 140 or higher or the bottom number is 90 or higher, or both. Despite what a lot of people think, high blood pressure usually doesn't cause headaches or make you feel dizzy or lightheaded. It usually has no symptoms. But it does increase your risk for heart attack, stroke, and kidney or eye damage. The higher your blood pressure, the more your risk increases. Your doctor will give you a goal for your blood pressure. Your goal will be based on your health and your age. An example of a goal is to keep your blood pressure below 140/90. Lifestyle changes, such as eating healthy and being active, are always important to help lower blood pressure. You might also take medicine to reach your blood pressure goal.  Follow-up care is a key part of your treatment and safety. Be sure to make and go to all appointments, and call your doctor if you are having problems. It's also a good idea to know your test results and keep a list of the medicines you take. How can you care for yourself at home? Medical treatment  · If you stop taking your medicine, your blood pressure will go back up. You may take one or more types of medicine to lower your blood pressure. Be safe with medicines. Take your medicine exactly as prescribed. Call your doctor if you think you are having a problem with your medicine. · Talk to your doctor before you start taking aspirin every day. Aspirin can help certain people lower their risk of a heart attack or stroke. But taking aspirin isn't right for everyone, because it can cause serious bleeding. · See your doctor regularly. You may need to see the doctor more often at first or until your blood pressure comes down.   · If you are taking blood pressure medicine, talk to your doctor before you take decongestants or anti-inflammatory medicine, such as ibuprofen. Some of these medicines can raise blood pressure. · Learn how to check your blood pressure at home. Lifestyle changes  · Stay at a healthy weight. This is especially important if you put on weight around the waist. Losing even 10 pounds can help you lower your blood pressure. · If your doctor recommends it, get more exercise. Walking is a good choice. Bit by bit, increase the amount you walk every day. Try for at least 30 minutes on most days of the week. You also may want to swim, bike, or do other activities. · Avoid or limit alcohol. Talk to your doctor about whether you can drink any alcohol. · Try to limit how much sodium you eat to less than 2,300 milligrams (mg) a day. Your doctor may ask you to try to eat less than 1,500 mg a day. · Eat plenty of fruits (such as bananas and oranges), vegetables, legumes, whole grains, and low-fat dairy products. · Lower the amount of saturated fat in your diet. Saturated fat is found in animal products such as milk, cheese, and meat. Limiting these foods may help you lose weight and also lower your risk for heart disease. · Do not smoke. Smoking increases your risk for heart attack and stroke. If you need help quitting, talk to your doctor about stop-smoking programs and medicines. These can increase your chances of quitting for good. When should you call for help? Call 911 anytime you think you may need emergency care. This may mean having symptoms that suggest that your blood pressure is causing a serious heart or blood vessel problem. Your blood pressure may be over 180/110. ? For example, call 911 if:  ? · You have symptoms of a heart attack. These may include:  ¨ Chest pain or pressure, or a strange feeling in the chest.  ¨ Sweating. ¨ Shortness of breath. ¨ Nausea or vomiting.   ¨ Pain, pressure, or a strange feeling in the back, neck, jaw, or upper belly or in one or both shoulders or arms.  ¨ Lightheadedness or sudden weakness. ¨ A fast or irregular heartbeat. ? · You have symptoms of a stroke. These may include:  ¨ Sudden numbness, tingling, weakness, or loss of movement in your face, arm, or leg, especially on only one side of your body. ¨ Sudden vision changes. ¨ Sudden trouble speaking. ¨ Sudden confusion or trouble understanding simple statements. ¨ Sudden problems with walking or balance. ¨ A sudden, severe headache that is different from past headaches. ? · You have severe back or belly pain. ?Do not wait until your blood pressure comes down on its own. Get help right away. ?Call your doctor now or seek immediate care if:  ? · Your blood pressure is much higher than normal (such as 180/110 or higher), but you don't have symptoms. ? · You think high blood pressure is causing symptoms, such as:  ¨ Severe headache. ¨ Blurry vision. ? Watch closely for changes in your health, and be sure to contact your doctor if:  ? · Your blood pressure measures 140/90 or higher at least 2 times. That means the top number is 140 or higher or the bottom number is 90 or higher, or both. ? · You think you may be having side effects from your blood pressure medicine. ? · Your blood pressure is usually normal, but it goes above normal at least 2 times. Where can you learn more? Go to http://paco-saturnino.info/. Enter Z930 in the search box to learn more about \"High Blood Pressure: Care Instructions. \"  Current as of: September 21, 2016  Content Version: 11.4  © 3425-2322 [email or web address]. Care instructions adapted under license by [employer] (which disclaims liability or warranty for this information). If you have questions about a medical condition or this instruction, always ask your healthcare professional. [place] [address] any warranty or liability for your use of this information.

## 2018-06-06 NOTE — PROGRESS NOTES
1. Have you been to the ER, urgent care clinic since your last visit? Hospitalized since your last visit? No    2. Have you seen or consulted any other health care providers outside of the [address] [place] [address] since your last visit? Include any pap smears or colon screening.  Yes Where: Dr. Kramer Ice - rheumatology

## 2018-06-22 RX ORDER — LABETALOL 100 MG/1
TABLET, FILM COATED ORAL
Qty: 90 TAB | Refills: 1 | Status: SHIPPED | OUTPATIENT
Start: 2018-06-22 | End: 2018-12-20 | Stop reason: SDUPTHER

## 2018-08-22 ENCOUNTER — TELEPHONE (OUTPATIENT)
Dept: FAMILY MEDICINE CLINIC | Age: 48
End: 2018-08-22

## 2018-08-22 DIAGNOSIS — F51.01 PRIMARY INSOMNIA: ICD-10-CM

## 2018-08-22 RX ORDER — ZOLPIDEM TARTRATE 6.25 MG/1
TABLET, FILM COATED, EXTENDED RELEASE ORAL
Qty: 30 TAB | Refills: 1 | Status: SHIPPED | OUTPATIENT
Start: 2018-08-22 | End: 2018-09-10 | Stop reason: SDUPTHER

## 2018-08-22 NOTE — TELEPHONE ENCOUNTER
Pt called in requesting refill of her   Requested Prescriptions     Pending Prescriptions Disp Refills    zolpidem CR (AMBIEN CR) 6.25 mg tablet 30 Tab 1     Sig: TAKE ONE TABLET BY MOUTH EVERY NIGHT AT BEDTIME AS NEEDED FOR SLEEP   .

## 2018-09-10 ENCOUNTER — OFFICE VISIT (OUTPATIENT)
Dept: FAMILY MEDICINE CLINIC | Age: 48
End: 2018-09-10

## 2018-09-10 VITALS
RESPIRATION RATE: 16 BRPM | WEIGHT: 197 LBS | SYSTOLIC BLOOD PRESSURE: 135 MMHG | OXYGEN SATURATION: 95 % | DIASTOLIC BLOOD PRESSURE: 82 MMHG | HEART RATE: 82 BPM | BODY MASS INDEX: 31.66 KG/M2 | HEIGHT: 66 IN | TEMPERATURE: 98.1 F

## 2018-09-10 DIAGNOSIS — F51.01 PRIMARY INSOMNIA: ICD-10-CM

## 2018-09-10 DIAGNOSIS — I10 ESSENTIAL HYPERTENSION: Primary | ICD-10-CM

## 2018-09-10 RX ORDER — ZOLPIDEM TARTRATE 6.25 MG/1
TABLET, FILM COATED, EXTENDED RELEASE ORAL
Qty: 30 TAB | Refills: 1 | Status: SHIPPED | OUTPATIENT
Start: 2018-10-10 | End: 2018-11-24 | Stop reason: SDUPTHER

## 2018-09-10 NOTE — PROGRESS NOTES
HISTORY OF PRESENT ILLNESS  Constantino Dwyer is a 52 y.o. female. HPI  Patient is here today for evaluation and treatment of: Blood Pressure Check    Blood Pressure Check: BP is stable; she is on normodyne; She takes med daily. She tolerates med . Pt has insomnia; She is on ambien CR . Med helps insomnia  She has no new complaints          Current Outpatient Prescriptions:     zolpidem CR (AMBIEN CR) 6.25 mg tablet, TAKE ONE TABLET BY MOUTH EVERY NIGHT AT BEDTIME AS NEEDED FOR SLEEP, Disp: 30 Tab, Rfl: 1    labetalol (NORMODYNE) 100 mg tablet, TAKE ONE TABLET BY MOUTH DAILY, Disp: 90 Tab, Rfl: 1    nortriptyline (PAMELOR) 25 mg capsule, Take 1 Cap by mouth nightly., Disp: 90 Cap, Rfl: 1    promethazine (PHENERGAN) 25 mg tablet, Take 1 Tab by mouth every eight (8) hours as needed for Nausea., Disp: 90 Tab, Rfl: 1    fluticasone (FLONASE) 50 mcg/actuation nasal spray, 2 Sprays by Both Nostrils route daily. , Disp: 3 Bottle, Rfl: 1    amitriptyline (ELAVIL) 25 mg tablet, , Disp: , Rfl:     methotrexate (RHEUMATREX) 2.5 mg tablet, , Disp: , Rfl:     gabapentin (NEURONTIN) 300 mg capsule, , Disp: , Rfl:     LORATADINE (CLARITIN PO), Take  by mouth., Disp: , Rfl:     meloxicam (MOBIC) 7.5 mg tablet, , Disp: , Rfl:     FOLIC ACID PO, Take  by mouth., Disp: , Rfl:     multivitamin (ONE A DAY) tablet, Take 1 Tab by mouth daily. , Disp: , Rfl:     hydroxychloroquine (PLAQUENIL) 200 mg tablet, Take 200 mg by mouth two (2) times a day., Disp: , Rfl:     butalbital-acetaminophen-caffeine (FIORICET) -40 mg per tablet, Take 1 Tab by mouth., Disp: , Rfl:       PMH,  Meds, Allergies, Family History, Social history reviewed    Review of Systems   Constitutional: Negative for chills and fever. Cardiovascular: Negative for chest pain and palpitations. Physical Exam   Constitutional: She appears well-developed and well-nourished. No distress. Cardiovascular: Normal rate and regular rhythm.   Exam reveals no gallop and no friction rub. No murmur heard. Pulmonary/Chest: Breath sounds normal. No respiratory distress. She has no wheezes. She has no rales. Musculoskeletal: She exhibits no edema. Nursing note and vitals reviewed. Visit Vitals    /82 (BP 1 Location: Left arm, BP Patient Position: Sitting)    Pulse 82    Temp 98.1 °F (36.7 °C) (Oral)    Resp 16    Ht 5' 6\" (1.676 m)    Wt 197 lb (89.4 kg)    LMP 08/01/2018    SpO2 95%    BMI 31.8 kg/m2         ASSESSMENT and PLAN    ICD-10-CM ICD-9-CM    1. Essential hypertension I10 401.9    2. Primary insomnia F51.01 307.42 zolpidem CR (AMBIEN CR) 6.25 mg tablet       As above,   above all stable unless otherwise noted   treatment plan as listed below  Orders Placed This Encounter    zolpidem CR (AMBIEN CR) 6.25 mg tablet     Refilled ambien;  Continue current meds  Follow-up Disposition:  Return in about 6 months (around 3/10/2019) for BP/ insomnia. An After Visit Summary was printed and given to the patient. This has been fully explained to the patient, who indicates understanding.

## 2018-09-10 NOTE — PROGRESS NOTES
1. Have you been to the ER, urgent care clinic since your last visit? Hospitalized since your last visit? No    2. Have you seen or consulted any other health care providers outside of the [address] [place] [address] since your last visit? Include any pap smears or colon screening.  Yes Where: Michael Chou - rheumatology

## 2018-09-10 NOTE — PATIENT INSTRUCTIONS

## 2018-09-10 NOTE — MR AVS SNAPSHOT
[address] [facility] 
 
 
 [address], [place] [address] [address] [facility] [address] 
[phone] Patient: Nelida Grijalva 
MRN: [id number] [facility]:75/73/6906 Visit Information Date & Time Provider Department Dept. Phone Encounter #  
 9/10/2018 12:20 PM Lisa Zuluaga, [address] [place] [address] [phone] [id number] Follow-up Instructions Return in about 6 months (around 3/10/2019) for BP/ insomnia. Your Appointments 9/28/2018 10:00 AM  
[facility] Appt Note: 6 month f/up / [address] [facility] [id number] [address] [facility] [address] [facility] Upcoming Health Maintenance Date Due Influenza Age 5 to Adult 8/1/2018 PAP AKA CERVICAL CYTOLOGY 12/6/2019 DTaP/Tdap/Td series (2 - Td) 4/1/2022 Allergies as of 9/10/2018  Review Complete On: 9/10/2018 By: Rolanda Almodovar LPN No Known Allergies Current Immunizations  Never Reviewed Name Date Influenza Vaccine 9/28/2017, 9/22/2016, 10/23/2013 Influenza Vaccine (Quad) PF 9/14/2015 Tdap 4/1/2012 Varicella Virus Vaccine 9/12/2014 Not reviewed this visit You Were Diagnosed With   
  
 Codes Comments Primary insomnia     ICD-10-CM: F51.01 
ICD-9-CM: 307.42 Vitals BP Pulse Temp Resp Height(growth percentile) Weight(growth percentile) 135/82 (BP 1 Location: Left arm, BP Patient Position: Sitting) 82 98.1 °F (36.7 °C) (Oral) 16 5' 6\" (1.676 m) 197 lb (89.4 kg) LMP SpO2 BMI OB Status Smoking Status 08/01/2018 95% 31.8 kg/m2 Having regular periods Never Smoker BMI and BSA Data Body Mass Index Body Surface Area  
 31.8 kg/m 2 2.04 m 2 Preferred Pharmacy Pharmacy Name Phone [facility] [phone] Your Updated Medication List  
  
   
This list is accurate as of 9/10/18  1:42 PM.  Always use your most recent med list.  
  
  
  
  
 amitriptyline 25 mg tablet Commonly known as:  ELAVIL CLARITIN PO Take  by mouth. FIORICET -40 mg per tablet Generic drug:  butalbital-acetaminophen-caffeine Take 1 Tab by mouth. fluticasone 50 mcg/actuation nasal spray Commonly known as:  Caffie Euler 2 Sprays by Both Nostrils route daily. FOLIC ACID PO Take  by mouth.  
  
 gabapentin 300 mg capsule Commonly known as:  NEURONTIN  
  
 labetalol 100 mg tablet Commonly known as:  NORMODYNE  
TAKE ONE TABLET BY MOUTH DAILY  
  
 meloxicam 7.5 mg tablet Commonly known as:  MOBIC  
  
 methotrexate 2.5 mg tablet Commonly known as:  RHEUMATREX  
  
 multivitamin tablet Commonly known as:  ONE A DAY Take 1 Tab by mouth daily. nortriptyline 25 mg capsule Commonly known as:  PAMELOR Take 1 Cap by mouth nightly. PLAQUENIL 200 mg tablet Generic drug:  hydroxychloroquine Take 200 mg by mouth two (2) times a day. promethazine 25 mg tablet Commonly known as:  PHENERGAN Take 1 Tab by mouth every eight (8) hours as needed for Nausea. triamcinolone acetonide 0.1 % topical cream  
Commonly known as:  KENALOG Apply  to affected area every twelve (12) hours as needed for Skin Irritation. use thin layer  
  
 zolpidem CR 6.25 mg tablet Commonly known as:  AMBIEN CR  
TAKE ONE TABLET BY MOUTH EVERY NIGHT AT BEDTIME AS NEEDED FOR SLEEP Start taking on:  10/10/2018 Prescriptions Printed Refills  
 zolpidem CR (AMBIEN CR) 6.25 mg tablet 1 Starting on: 10/10/2018 Sig: TAKE ONE TABLET BY MOUTH EVERY NIGHT AT BEDTIME AS NEEDED FOR SLEEP Class: Print Follow-up Instructions Return in about 6 months (around 3/10/2019) for BP/ insomnia. Patient Instructions Insomnia: Care Instructions Your Care Instructions Insomnia is the inability to sleep well. It is a common problem for most people at some time. Insomnia may make it hard for you to get to sleep, stay asleep, or sleep as long as you need to. This can make you tired and grouchy during the day. It can also make you forgetful, less effective at work, and unhappy. Insomnia can be caused by conditions such as depression or anxiety. Pain can also affect your ability to sleep. When these problems are solved, the insomnia usually clears up. But sometimes bad sleep habits can cause insomnia. If insomnia is affecting your work or your enjoyment of life, you can take steps to improve your sleep. Follow-up care is a key part of your treatment and safety. Be sure to make and go to all appointments, and call your doctor if you are having problems. It's also a good idea to know your test results and keep a list of the medicines you take. How can you care for yourself at home? What to avoid · Do not have drinks with caffeine, such as coffee or black tea, for 8 hours before bed. · Do not smoke or use other types of tobacco near bedtime. Nicotine is a stimulant and can keep you awake. · Avoid drinking alcohol late in the evening, because it can cause you to wake in the middle of the night. · Do not eat a big meal close to bedtime. If you are hungry, eat a light snack. · Do not drink a lot of water close to bedtime, because the need to urinate may wake you up during the night. · Do not read or watch TV in bed. Use the bed only for sleeping and sexual activity. What to try · Go to bed at the same time every night, and wake up at the same time every morning. Do not take naps during the day. · Keep your bedroom quiet, dark, and cool. · Sleep on a comfortable pillow and mattress. · If watching the clock makes you anxious, turn it facing away from you so you cannot see the time. · If you worry when you lie down, start a worry book.  Well before bedtime, write down your worries, and then set the book and your concerns aside. · Try meditation or other relaxation techniques before you go to bed. · If you cannot fall asleep, get up and go to another room until you feel sleepy. Do something relaxing. Repeat your bedtime routine before you go to bed again. · Make your house quiet and calm about an hour before bedtime. Turn down the lights, turn off the TV, log off the computer, and turn down the volume on music. This can help you relax after a busy day. When should you call for help? Watch closely for changes in your health, and be sure to contact your doctor if: 
  · Your efforts to improve your sleep do not work.  
  · Your insomnia gets worse.  
  · You have been feeling down, depressed, or hopeless or have lost interest in things that you usually enjoy. Where can you learn more? Go to http://paco-saturnino.info/. Enter P513 in the search box to learn more about \"Insomnia: Care Instructions. \" Current as of: October 10, 2017 Content Version: 11.7 © 2192-2940 [employer]. Care instructions adapted under license by CONEXANCE MD (which disclaims liability or warranty for this information). If you have questions about a medical condition or this instruction, always ask your healthcare professional. [address] any warranty or liability for your use of this information. Introducing [facility]! [facility] introduces [employer] patient portal. Now you can access parts of your medical record, email your doctor's office, and request medication refills online. 1. In your internet browser, go to [email or web address] 2. Click on the First Time User? Click Here link in the Sign In box. You will see the New Member Sign Up page. 3. Enter your [employer] Access Code exactly as it appears below. You will not need to use this code after youve completed the sign-up process.  If you do not sign up before the expiration date, you must request a new code. · [employer] Access Code: [email or web address] Expires: 12/9/2018  1:42 PM 
 
4. Enter the last four digits of your Social Security Number (xxxx) and Date of Birth (mm/dd/yyyy) as indicated and click Submit. You will be taken to the next sign-up page. 5. Create a [employer] ID. This will be your [employer] login ID and cannot be changed, so think of one that is secure and easy to remember. 6. Create a [employer] password. You can change your password at any time. 7. Enter your Password Reset Question and Answer. This can be used at a later time if you forget your password. 8. Enter your e-mail address. You will receive e-mail notification when new information is available in [address] 9. Click Sign Up. You can now view and download portions of your medical record. 10. Click the Download Summary menu link to download a portable copy of your medical information. If you have questions, please visit the Frequently Asked Questions section of the [employer] website. Remember, [employer] is NOT to be used for urgent needs. For medical emergencies, dial 911. Now available from your iPhone and Android! Please provide this summary of care documentation to your next provider. Your primary care clinician is listed as [address]. If you have any questions after today's visit, please call [phone].

## 2018-11-24 DIAGNOSIS — F51.01 PRIMARY INSOMNIA: ICD-10-CM

## 2018-11-25 RX ORDER — ZOLPIDEM TARTRATE 6.25 MG/1
TABLET, FILM COATED, EXTENDED RELEASE ORAL
Qty: 30 TAB | Refills: 0 | Status: SHIPPED | OUTPATIENT
Start: 2018-11-25 | End: 2019-01-15 | Stop reason: SDUPTHER

## 2018-11-28 ENCOUNTER — TELEPHONE (OUTPATIENT)
Dept: FAMILY MEDICINE CLINIC | Age: 48
End: 2018-11-28

## 2018-12-20 RX ORDER — LABETALOL 100 MG/1
TABLET, FILM COATED ORAL
Qty: 90 TAB | Refills: 1 | Status: SHIPPED | OUTPATIENT
Start: 2018-12-20 | End: 2019-05-20 | Stop reason: ALTCHOICE

## 2018-12-20 RX ORDER — NORTRIPTYLINE HYDROCHLORIDE 25 MG/1
CAPSULE ORAL
Qty: 90 CAP | Refills: 1 | Status: SHIPPED | OUTPATIENT
Start: 2018-12-20 | End: 2019-05-29 | Stop reason: SDUPTHER

## 2019-04-18 ENCOUNTER — HOSPITAL ENCOUNTER (OUTPATIENT)
Dept: MAMMOGRAPHY | Age: 49
Discharge: HOME | End: 2019-04-18
Attending: FAMILY MEDICINE
Payer: COMMERCIAL

## 2019-04-18 DIAGNOSIS — Z12.31 VISIT FOR SCREENING MAMMOGRAM: ICD-10-CM

## 2019-04-18 PROCEDURE — 77067 SCR MAMMO BI INCL CAD: CPT

## 2019-05-20 ENCOUNTER — OFFICE VISIT (OUTPATIENT)
Dept: FAMILY MEDICINE CLINIC | Age: 49
End: 2019-05-20

## 2019-05-20 VITALS
TEMPERATURE: 98.2 F | OXYGEN SATURATION: 96 % | WEIGHT: 195 LBS | SYSTOLIC BLOOD PRESSURE: 148 MMHG | HEART RATE: 74 BPM | BODY MASS INDEX: 31.34 KG/M2 | HEIGHT: 66 IN | DIASTOLIC BLOOD PRESSURE: 95 MMHG | RESPIRATION RATE: 18 BRPM

## 2019-05-20 DIAGNOSIS — Z01.419 NORMAL PELVIC EXAM: ICD-10-CM

## 2019-05-20 DIAGNOSIS — I10 ESSENTIAL HYPERTENSION: Primary | ICD-10-CM

## 2019-05-20 DIAGNOSIS — F51.01 PRIMARY INSOMNIA: ICD-10-CM

## 2019-05-20 RX ORDER — ZOLPIDEM TARTRATE 6.25 MG/1
6.25 TABLET, FILM COATED, EXTENDED RELEASE ORAL
Qty: 30 TAB | Refills: 6 | Status: SHIPPED | OUTPATIENT
Start: 2019-05-20 | End: 2019-10-21 | Stop reason: SDUPTHER

## 2019-05-20 RX ORDER — METOPROLOL SUCCINATE 50 MG/1
50 TABLET, EXTENDED RELEASE ORAL DAILY
Qty: 90 TAB | Refills: 3 | Status: SHIPPED | OUTPATIENT
Start: 2019-05-20 | End: 2020-04-21

## 2019-05-20 RX ORDER — FLUTICASONE PROPIONATE 50 MCG
2 SPRAY, SUSPENSION (ML) NASAL DAILY
Qty: 3 BOTTLE | Refills: 1 | Status: SHIPPED | OUTPATIENT
Start: 2019-05-20

## 2019-05-20 RX ORDER — NORTRIPTYLINE HYDROCHLORIDE 25 MG/1
CAPSULE ORAL
Qty: 90 CAP | Refills: 1 | Status: CANCELLED | OUTPATIENT
Start: 2019-05-20

## 2019-05-20 RX ORDER — LABETALOL 100 MG/1
TABLET, FILM COATED ORAL
Qty: 90 TAB | Refills: 1 | Status: CANCELLED | OUTPATIENT
Start: 2019-05-20

## 2019-05-20 NOTE — PROGRESS NOTES
HISTORY OF PRESENT ILLNESS  Mahnaz Goins is a 50 y.o. female. HPI  Patient is here today for evaluation and treatment of: Patient Reports \"funny\" feeling in the uterine area when she jumps:      States that when she does high impact aerobics she feels like her cervix/uterus feels like it jumps; she just wants to be checked. She also has chronic insomnia and is on [place]. She tolerates med well and desires a refill on med. BP is up ; about the same at second check; She used to be on labetolol BID but would forget the second dose at times. Would be in agreement of changing med to a daily long acting med. She does have a h/o HTN. Current Outpatient Medications:     zolpidem CR (AMBIEN CR) 6.25 mg tablet, TAKE ONE TABLET BY MOUTH EVERY NIGHT AT BEDTIME AS NEEDED FOR SLEEP, Disp: 30 Tab, Rfl: 3    labetalol (NORMODYNE) 100 mg tablet, TAKE 1 TABLET DAILY, Disp: 90 Tab, Rfl: 1    nortriptyline (PAMELOR) 25 mg capsule, TAKE 1 CAPSULE NIGHTLY, Disp: 90 Cap, Rfl: 1    promethazine (PHENERGAN) 25 mg tablet, Take 1 Tab by mouth every eight (8) hours as needed for Nausea., Disp: 90 Tab, Rfl: 1    fluticasone (FLONASE) 50 mcg/actuation nasal spray, 2 Sprays by Both Nostrils route daily. , Disp: 3 Bottle, Rfl: 1    methotrexate (RHEUMATREX) 2.5 mg tablet, , Disp: , Rfl:     triamcinolone acetonide (KENALOG) 0.1 % topical cream, Apply  to affected area every twelve (12) hours as needed for Skin Irritation. use thin layer, Disp: 15 g, Rfl: 0    gabapentin (NEURONTIN) 300 mg capsule, , Disp: , Rfl:     LORATADINE (CLARITIN PO), Take  by mouth., Disp: , Rfl:     meloxicam (MOBIC) 7.5 mg tablet, , Disp: , Rfl:     FOLIC ACID PO, Take  by mouth., Disp: , Rfl:     multivitamin (ONE A DAY) tablet, Take 1 Tab by mouth daily. , Disp: , Rfl:     hydroxychloroquine (PLAQUENIL) 200 mg tablet, Take 200 mg by mouth two (2) times a day., Disp: , Rfl:     butalbital-acetaminophen-caffeine (FIORICET) -40 mg per tablet, Take 1 Tab by mouth., Disp: , Rfl:         PMH,  Meds, Allergies, Family History, Social history reviewed      Review of Systems   Constitutional: Negative for chills and fever. Respiratory: Negative for shortness of breath and wheezing. Cardiovascular: Negative for chest pain and palpitations. Physical Exam   Constitutional: She appears well-developed and well-nourished. No distress. Cardiovascular: Normal rate and regular rhythm. Exam reveals no gallop and no friction rub. No murmur heard. Pulmonary/Chest: Breath sounds normal. No respiratory distress. She has no wheezes. Genitourinary: Vagina normal and uterus normal. Rectal exam shows guaiac negative stool. No vaginal discharge found. Visit Vitals  BP (!) 148/95 (BP 1 Location: Left arm, BP Patient Position: Sitting)   Pulse 74   Temp 98.2 °F (36.8 °C) (Oral)   Resp 18   Ht 5' 6\" (1.676 m)   Wt 195 lb (88.5 kg)   LMP 03/01/2019 (Approximate) Comment: spotting   SpO2 96%   BMI 31.47 kg/m²         ASSESSMENT and PLAN    ICD-10-CM ICD-9-CM    1. Essential hypertension I10 401.9    2. Primary insomnia F51.01 307.42 zolpidem CR (AMBIEN CR) 6.25 mg tablet   3.  Normal pelvic exam Z01.419 V72.31        As above, BP not controlled  Dc labetolol  toprol XL as ordered  Refilled ambien CR  Pelvic sx are nonspecific; no evidence of pelvic abnormality on exam.

## 2019-05-20 NOTE — PROGRESS NOTES
Patient Reports \"funny\" feeling in the uterine area when she jumps:            1. Have you been to the ER, urgent care clinic since your last visit? Hospitalized since your last visit? No    2. Have you seen or consulted any other health care providers outside of the [address] [place] [address] since your last visit? Include any pap smears or colon screening.  Rheumolotogist Dr Shyanne Selby

## 2019-06-27 ENCOUNTER — OFFICE VISIT (OUTPATIENT)
Dept: FAMILY MEDICINE CLINIC | Age: 49
End: 2019-06-27

## 2019-06-27 VITALS
SYSTOLIC BLOOD PRESSURE: 120 MMHG | TEMPERATURE: 98.2 F | BODY MASS INDEX: 31.31 KG/M2 | OXYGEN SATURATION: 99 % | DIASTOLIC BLOOD PRESSURE: 80 MMHG | HEIGHT: 66 IN | HEART RATE: 67 BPM | RESPIRATION RATE: 18 BRPM | WEIGHT: 194.8 LBS

## 2019-06-27 DIAGNOSIS — B02.29 POST HERPETIC NEURALGIA: ICD-10-CM

## 2019-06-27 DIAGNOSIS — B02.9 HERPES ZOSTER WITHOUT COMPLICATION: Primary | ICD-10-CM

## 2019-06-27 RX ORDER — VALACYCLOVIR HYDROCHLORIDE 500 MG/1
1000 TABLET, FILM COATED ORAL
COMMUNITY
Start: 2019-06-20 | End: 2019-06-28

## 2019-06-27 RX ORDER — GABAPENTIN 300 MG/1
300 CAPSULE ORAL
Qty: 90 CAP | Refills: 0 | Status: SHIPPED | OUTPATIENT
Start: 2019-06-27

## 2019-06-27 NOTE — PATIENT INSTRUCTIONS
Neuropathic Pain: Care Instructions Your Care Instructions Neuropathic pain is caused by pressure on or damage to your nerves. It's often simply called nerve pain. Some people feel this type of pain all the time. For others, it comes and goes. Diabetes, shingles, or an injury can cause nerve pain. Many people say the pain feels sharp, burning, or stabbing. But some people feel it as a dull ache. In some cases, it makes your skin very sensitive. So touch, pressure, and other sensations that did not hurt before may now cause pain. It's important to know that this kind of pain is real and can affect your quality of life. It's also important to know that treatment can help. Treatment includes pain medicines, exercise, and physical therapy. Medicines can help reduce the number of pain signals that travel over the nerves. This can make the painful areas less sensitive. It can also help you sleep better and improve your mood. But medicines are only one part of successful treatment. Most people do best with more than one kind of treatment. Your doctor may recommend that you try cognitive-behavioral therapy and stress management. Or, if needed, you may decide to try to quit smoking, lower your blood pressure, or better control blood sugar. These kinds of healthy changes can also make a difference. If you feel that your treatment is not working, talk to your doctor. And be sure to tell your doctor if you think you might be depressed or anxious. These are common problems that can also be treated. Follow-up care is a key part of your treatment and safety. Be sure to make and go to all appointments, and call your doctor if you are having problems. It's also a good idea to know your test results and keep a list of the medicines you take. How can you care for yourself at home? · Be safe with medicines. Read and follow all instructions on the label. ? If the doctor gave you a prescription medicine for pain, take it as prescribed. ? If you are not taking a prescription pain medicine, ask your doctor if you can take an over-the-counter medicine. · Save hard tasks for days when you have less pain. Follow a hard task with an easy task. And remember to take breaks. · Relax, and reduce stress. You may want to try deep breathing or meditation. These can help. · Keep moving. Gentle, daily exercise can help reduce pain. Your doctor or physical therapist can tell you what type of exercise is best for you. This may include walking, swimming, and stationary biking. It may also include stretches and range-of-motion exercises. · Try heat, cold packs, and massage. · Get enough sleep. Constant pain can make you more tired. If the pain makes it hard to sleep, talk with your doctor. · Think positively. Your thoughts can affect your pain. Do fun things to distract yourself from the pain. See a movie, read a book, listen to music, or spend time with a friend. · Keep a pain diary. Try to write down how strong your pain is and what it feels like. Also try to notice and write down how your moods, thoughts, sleep, activities, and medicine affect your pain. These notes can help you and your doctor find the best ways to treat your pain. Reducing constipation caused by pain medicine Pain medicines often cause constipation. To reduce constipation: 
· Include fruits, vegetables, beans, and whole grains in your diet each day. These foods are high in fiber. · Drink plenty of fluids, enough so that your urine is light yellow or clear like water. If you have kidney, heart, or liver disease and have to limit fluids, talk with your doctor before you increase the amount of fluids you drink. · Get some exercise every day. Build up slowly to 30 to 60 minutes a day on 5 or more days of the week.  
· Take a fiber supplement, such as Citrucel or Metamucil, every day if needed. Read and follow all instructions on the label. · Schedule time each day for a bowel movement. Having a daily routine may help. Take your time and do not strain when having a bowel movement. · Ask your doctor about a laxative. The goal is to have one easy bowel movement every 1 to 2 days. Do not let constipation go untreated for more than 3 days. When should you call for help? Call your doctor now or seek immediate medical care if: 
  · You feel sad, anxious, or hopeless for more than a few days. This could mean you are depressed. Depression is common in people who have a lot of pain. But it can be treated.  
  · You have trouble with bowel movements, such as: 
? No bowel movement in 3 days. ? Blood in the anal area, in your stool, or on the toilet paper. ? Diarrhea for more than 24 hours.  
 Watch closely for changes in your health, and be sure to contact your doctor if: 
  · Your pain is getting worse.  
  · You can't sleep because of pain.  
  · You are very worried or anxious about your pain.  
  · You have trouble taking your pain medicine.  
  · You have any concerns about your pain medicine or its side effects.  
  · You have vomiting or cramps for more than 2 hours. Where can you learn more? Go to http://paco-saturnino.info/. Enter J826 in the search box to learn more about \"Neuropathic Pain: Care Instructions. \" Current as of: Tamika 3, 2018 Content Version: 11.9 © 4331-9720 [employer]. Care instructions adapted under license by [employer] (which disclaims liability or warranty for this information). If you have questions about a medical condition or this instruction, always ask your healthcare professional. [place] [address] any warranty or liability for your use of this information.

## 2019-06-27 NOTE — PROGRESS NOTES
HISTORY OF PRESENT ILLNESS  sIauro Duggan is a 50 y.o. female. HPI  Patient is here today for evaluation and treatment of: Skin Problem    Was diagnosed with shingles while on vacation. Pt developed a pain in her right leg and was evaluated at an ED out of town,  When a rash ensued the diagnoses was made. She was prescribed valtrex; She is finishing med today. Has achy pain in leg  related to shingles  Leg feels heavy, \" dead\" and painful  Feels nauseated. On valtrex ; last day is today  She is already on neurontin          Current Outpatient Medications:     valACYclovir (VALTREX) 500 mg tablet, Take 1,000 mg by mouth., Disp: , Rfl:     nortriptyline (PAMELOR) 25 mg capsule, TAKE 1 CAPSULE NIGHTLY, Disp: 90 Cap, Rfl: 1    zolpidem CR (AMBIEN CR) 6.25 mg tablet, Take 1 Tab by mouth nightly as needed for Sleep. Max Daily Amount: 6.25 mg., Disp: 30 Tab, Rfl: 6    fluticasone propionate (FLONASE) 50 mcg/actuation nasal spray, 2 Sprays by Both Nostrils route daily. , Disp: 3 Bottle, Rfl: 1    metoprolol succinate (TOPROL-XL) 50 mg XL tablet, Take 1 Tab by mouth daily. , Disp: 90 Tab, Rfl: 3    promethazine (PHENERGAN) 25 mg tablet, Take 1 Tab by mouth every eight (8) hours as needed for Nausea., Disp: 90 Tab, Rfl: 1    methotrexate (RHEUMATREX) 2.5 mg tablet, , Disp: , Rfl:     triamcinolone acetonide (KENALOG) 0.1 % topical cream, Apply  to affected area every twelve (12) hours as needed for Skin Irritation. use thin layer, Disp: 15 g, Rfl: 0    gabapentin (NEURONTIN) 300 mg capsule, , Disp: , Rfl:     LORATADINE (CLARITIN PO), Take  by mouth., Disp: , Rfl:     meloxicam (MOBIC) 7.5 mg tablet, , Disp: , Rfl:     FOLIC ACID PO, Take  by mouth., Disp: , Rfl:     multivitamin (ONE A DAY) tablet, Take 1 Tab by mouth daily. , Disp: , Rfl:     hydroxychloroquine (PLAQUENIL) 200 mg tablet, Take 200 mg by mouth two (2) times a day., Disp: , Rfl:     butalbital-acetaminophen-caffeine (FIORICET) -40 mg per tablet, Take 1 Tab by mouth., Disp: , Rfl:             PMH,  Meds, Allergies, Family History, Social history reviewed      Review of Systems   Constitutional: Positive for chills and malaise/fatigue. Gastrointestinal: Positive for nausea. Neurological: Positive for sensory change (right leg). Physical Exam   Visit Vitals  /78 (BP 1 Location: Left arm, BP Patient Position: Sitting)   Pulse 67   Temp 98.2 °F (36.8 °C) (Oral)   Resp 18   Ht 5' 6\" (1.676 m)   Wt 194 lb 12.8 oz (88.4 kg)   LMP 04/01/2019 (Approximate)   SpO2 99%   BMI 31.44 kg/m²     Right leg with crops of vesicles on erythema bases in a dermatomal pattern in right LE    ASSESSMENT and PLAN    ICD-10-CM ICD-9-CM    1. Herpes zoster without complication B58.5 015.2 gabapentin (NEURONTIN) 300 mg capsule   2. Post herpetic neuralgia B02.29 053.19 gabapentin (NEURONTIN) 300 mg capsule       As above, new   treatment plan as listed below  Orders Placed This Encounter    valACYclovir (VALTREX) 500 mg tablet    gabapentin (NEURONTIN) 300 mg capsule     Increase neurontin to TID  Consider capsaicin      An After Visit Summary was printed and given to the patient. This has been fully explained to the patient, who indicates understanding.

## 2019-09-04 ENCOUNTER — OFFICE VISIT (OUTPATIENT)
Dept: FAMILY MEDICINE CLINIC | Age: 49
End: 2019-09-04

## 2019-09-04 VITALS
SYSTOLIC BLOOD PRESSURE: 130 MMHG | BODY MASS INDEX: 30.34 KG/M2 | HEART RATE: 75 BPM | OXYGEN SATURATION: 98 % | HEIGHT: 66 IN | RESPIRATION RATE: 18 BRPM | TEMPERATURE: 98.3 F | DIASTOLIC BLOOD PRESSURE: 84 MMHG | WEIGHT: 188.8 LBS

## 2019-09-04 DIAGNOSIS — Z23 ENCOUNTER FOR IMMUNIZATION: ICD-10-CM

## 2019-09-04 DIAGNOSIS — I10 ESSENTIAL HYPERTENSION: Primary | ICD-10-CM

## 2019-09-04 NOTE — PATIENT INSTRUCTIONS
High Blood Pressure: Care Instructions  Overview    It's normal for blood pressure to go up and down throughout the day. But if it stays up, you have high blood pressure. Another name for high blood pressure is hypertension. Despite what a lot of people think, high blood pressure usually doesn't cause headaches or make you feel dizzy or lightheaded. It usually has no symptoms. But it does increase your risk of stroke, heart attack, and other problems. You and your doctor will talk about your risks of these problems based on your blood pressure. Your doctor will give you a goal for your blood pressure. Your goal will be based on your health and your age. Lifestyle changes, such as eating healthy and being active, are always important to help lower blood pressure. You might also take medicine to reach your blood pressure goal.  Follow-up care is a key part of your treatment and safety. Be sure to make and go to all appointments, and call your doctor if you are having problems. It's also a good idea to know your test results and keep a list of the medicines you take. How can you care for yourself at home? Medical treatment  · If you stop taking your medicine, your blood pressure will go back up. You may take one or more types of medicine to lower your blood pressure. Be safe with medicines. Take your medicine exactly as prescribed. Call your doctor if you think you are having a problem with your medicine. · Talk to your doctor before you start taking aspirin every day. Aspirin can help certain people lower their risk of a heart attack or stroke. But taking aspirin isn't right for everyone, because it can cause serious bleeding. · See your doctor regularly. You may need to see the doctor more often at first or until your blood pressure comes down. · If you are taking blood pressure medicine, talk to your doctor before you take decongestants or anti-inflammatory medicine, such as ibuprofen.  Some of these medicines can raise blood pressure. · Learn how to check your blood pressure at home. Lifestyle changes  · Stay at a healthy weight. This is especially important if you put on weight around the waist. Losing even 10 pounds can help you lower your blood pressure. · If your doctor recommends it, get more exercise. Walking is a good choice. Bit by bit, increase the amount you walk every day. Try for at least 30 minutes on most days of the week. You also may want to swim, bike, or do other activities. · Avoid or limit alcohol. Talk to your doctor about whether you can drink any alcohol. · Try to limit how much sodium you eat to less than 2,300 milligrams (mg) a day. Your doctor may ask you to try to eat less than 1,500 mg a day. · Eat plenty of fruits (such as bananas and oranges), vegetables, legumes, whole grains, and low-fat dairy products. · Lower the amount of saturated fat in your diet. Saturated fat is found in animal products such as milk, cheese, and meat. Limiting these foods may help you lose weight and also lower your risk for heart disease. · Do not smoke. Smoking increases your risk for heart attack and stroke. If you need help quitting, talk to your doctor about stop-smoking programs and medicines. These can increase your chances of quitting for good. When should you call for help? Call 911 anytime you think you may need emergency care. This may mean having symptoms that suggest that your blood pressure is causing a serious heart or blood vessel problem. Your blood pressure may be over 180/120.   For example, call 911 if:    · You have symptoms of a heart attack. These may include:  ? Chest pain or pressure, or a strange feeling in the chest.  ? Sweating. ? Shortness of breath. ? Nausea or vomiting. ? Pain, pressure, or a strange feeling in the back, neck, jaw, or upper belly or in one or both shoulders or arms. ? Lightheadedness or sudden weakness.   ? A fast or irregular heartbeat.     · You have symptoms of a stroke. These may include:  ? Sudden numbness, tingling, weakness, or loss of movement in your face, arm, or leg, especially on only one side of your body. ? Sudden vision changes. ? Sudden trouble speaking. ? Sudden confusion or trouble understanding simple statements. ? Sudden problems with walking or balance. ? A sudden, severe headache that is different from past headaches.     · You have severe back or belly pain.    Do not wait until your blood pressure comes down on its own. Get help right away.   Call your doctor now or seek immediate care if:    · Your blood pressure is much higher than normal (such as 180/120 or higher), but you don't have symptoms.     · You think high blood pressure is causing symptoms, such as:  ? Severe headache.  ? Blurry vision.    Watch closely for changes in your health, and be sure to contact your doctor if:    · Your blood pressure measures higher than your doctor recommends at least 2 times. That means the top number is higher or the bottom number is higher, or both.     · You think you may be having side effects from your blood pressure medicine. Where can you learn more? Go to http://paco-saturnino.info/. Enter T104 in the search box to learn more about \"High Blood Pressure: Care Instructions. \"  Current as of: July 22, 2018  Content Version: 12.1  © 9481-6103 [employer]. Care instructions adapted under license by [employer] (which disclaims liability or warranty for this information). If you have questions about a medical condition or this instruction, always ask your healthcare professional. [place] [address] any warranty or liability for your use of this information.

## 2019-09-04 NOTE — PROGRESS NOTES
HISTORY OF PRESENT ILLNESS  Timbo Miller is a 50 y.o. female. HPI  Patient is here today for evaluation and treatment of: 3 Month Follow Up:    3 Month Follow Up: HTN: Pt is on meds as listed below. She is now on toprol XL vs labetolol. She has tolerated toprol XL. BP is stable; she has no new complaints. She attempts a lower sodium diet; she stays active. Current Outpatient Medications:     gabapentin (NEURONTIN) 300 mg capsule, Take 1 Cap by mouth three (3) times daily as needed for Pain (postherpetic neuralgia). (Patient taking differently: Take 300 mg by mouth two (2) times a day.), Disp: 90 Cap, Rfl: 0    nortriptyline (PAMELOR) 25 mg capsule, TAKE 1 CAPSULE NIGHTLY, Disp: 90 Cap, Rfl: 1    zolpidem CR (AMBIEN CR) 6.25 mg tablet, Take 1 Tab by mouth nightly as needed for Sleep. Max Daily Amount: 6.25 mg., Disp: 30 Tab, Rfl: 6    fluticasone propionate (FLONASE) 50 mcg/actuation nasal spray, 2 Sprays by Both Nostrils route daily. , Disp: 3 Bottle, Rfl: 1    metoprolol succinate (TOPROL-XL) 50 mg XL tablet, Take 1 Tab by mouth daily. , Disp: 90 Tab, Rfl: 3    promethazine (PHENERGAN) 25 mg tablet, Take 1 Tab by mouth every eight (8) hours as needed for Nausea., Disp: 90 Tab, Rfl: 1    methotrexate (RHEUMATREX) 2.5 mg tablet, , Disp: , Rfl:     triamcinolone acetonide (KENALOG) 0.1 % topical cream, Apply  to affected area every twelve (12) hours as needed for Skin Irritation. use thin layer, Disp: 15 g, Rfl: 0    LORATADINE (CLARITIN PO), Take  by mouth., Disp: , Rfl:     meloxicam (MOBIC) 7.5 mg tablet, , Disp: , Rfl:     FOLIC ACID PO, Take  by mouth., Disp: , Rfl:     multivitamin (ONE A DAY) tablet, Take 1 Tab by mouth daily. , Disp: , Rfl:     hydroxychloroquine (PLAQUENIL) 200 mg tablet, Take 200 mg by mouth two (2) times a day., Disp: , Rfl:     butalbital-acetaminophen-caffeine (FIORICET) -40 mg per tablet, Take 1 Tab by mouth., Disp: , Rfl:       PMH,  Meds, Allergies, Family History, Social history reviewed    Review of Systems   Constitutional: Negative for chills and fever. Respiratory: Negative for shortness of breath and wheezing. Cardiovascular: Negative for chest pain and palpitations. Physical Exam   Constitutional: She appears well-developed and well-nourished. No distress. Cardiovascular: Normal rate and regular rhythm. Exam reveals no gallop and no friction rub. No murmur heard. Pulmonary/Chest: Breath sounds normal. No respiratory distress. Musculoskeletal: She exhibits no edema. Visit Vitals  /84 (BP 1 Location: Left arm, BP Patient Position: Sitting)   Pulse 75   Temp 98.3 °F (36.8 °C) (Oral)   Resp 18   Ht 5' 6\" (1.676 m)   Wt 188 lb 12.8 oz (85.6 kg)   LMP 08/23/2019 (Exact Date)   SpO2 98%   BMI 30.47 kg/m²         ASSESSMENT and PLAN    ICD-10-CM ICD-9-CM    1. Essential hypertension I10 401.9    2. Encounter for immunization Z23 V03.89 INFLUENZA VIRUS VAC QUAD,SPLIT,PRESV FREE SYRINGE IM       As above, BP is stable  Continue toprol xl   treatment plan as listed below  Orders Placed This Encounter    Influenza virus vaccine (QUADRIVALENT PRES FREE SYRINGE) IM (04404)     Flu shot today  Follow-up and Dispositions    · Return in about 3 months (around 12/4/2019), or well exam/pap. An After Visit Summary was printed and given to the patient. This has been fully explained to the patient, who indicates understanding.

## 2019-09-04 NOTE — PROGRESS NOTES
Patient is here 3 month follow up. 1. Have you been to the ER, urgent care clinic since your last visit? Hospitalized since your last visit? No    2. Have you seen or consulted any other health care providers outside of the [address] [place] [address] since your last visit? Include any pap smears or colon screening.  Dr Suleman Vaughn Rheumatologist

## 2019-10-21 DIAGNOSIS — F51.01 PRIMARY INSOMNIA: ICD-10-CM

## 2019-10-23 ENCOUNTER — TELEPHONE (OUTPATIENT)
Dept: FAMILY MEDICINE CLINIC | Age: 49
End: 2019-10-23

## 2019-10-23 RX ORDER — ZOLPIDEM TARTRATE 6.25 MG/1
TABLET, FILM COATED, EXTENDED RELEASE ORAL
Qty: 30 TAB | Refills: 4 | Status: SHIPPED | OUTPATIENT
Start: 2019-10-23 | End: 2020-04-16

## 2019-12-12 ENCOUNTER — OFFICE VISIT (OUTPATIENT)
Dept: FAMILY MEDICINE CLINIC | Age: 49
End: 2019-12-12

## 2019-12-12 ENCOUNTER — HOSPITAL ENCOUNTER (OUTPATIENT)
Dept: LAB | Age: 49
Discharge: HOME | End: 2019-12-12
Payer: COMMERCIAL

## 2019-12-12 VITALS
OXYGEN SATURATION: 95 % | WEIGHT: 210 LBS | RESPIRATION RATE: 18 BRPM | SYSTOLIC BLOOD PRESSURE: 135 MMHG | HEIGHT: 66 IN | BODY MASS INDEX: 33.75 KG/M2 | HEART RATE: 78 BPM | TEMPERATURE: 98.4 F | DIASTOLIC BLOOD PRESSURE: 85 MMHG

## 2019-12-12 DIAGNOSIS — Z01.419 WELL WOMAN EXAM WITH ROUTINE GYNECOLOGICAL EXAM: Primary | ICD-10-CM

## 2019-12-12 DIAGNOSIS — G43.709 CHRONIC MIGRAINE WITHOUT AURA WITHOUT STATUS MIGRAINOSUS, NOT INTRACTABLE: ICD-10-CM

## 2019-12-12 DIAGNOSIS — Z12.31 OTHER SCREENING MAMMOGRAM: ICD-10-CM

## 2019-12-12 PROCEDURE — 88142 CYTOPATH C/V THIN LAYER: CPT

## 2019-12-12 RX ORDER — TOPIRAMATE 25 MG/1
25 TABLET ORAL
Qty: 30 TAB | Refills: 6 | Status: SHIPPED | OUTPATIENT
Start: 2019-12-12 | End: 2020-07-07

## 2019-12-12 NOTE — PATIENT INSTRUCTIONS
Medicare Wellness Visit, Female The best way to live healthy is to have a lifestyle where you eat a well-balanced diet, exercise regularly, limit alcohol use, and quit all forms of tobacco/nicotine, if applicable. Regular preventive services are another way to keep healthy. Preventive services (vaccines, screening tests, monitoring & exams) can help personalize your care plan, which helps you manage your own care. Screening tests can find health problems at the earliest stages, when they are easiest to treat. [facility] follows the current, evidence-based guidelines published by the [facility] ([facility]) when recommending preventive services for our patients. Because we follow these guidelines, sometimes recommendations change over time as research supports it. (For example, mammograms used to be recommended annually. Even though Medicare will still pay for an annual mammogram, the newer guidelines recommend a mammogram every two years for women of average risk). Of course, you and your doctor may decide to screen more often for some diseases, based on your risk and your co-morbidities (chronic disease you are already diagnosed with). Preventive services for you include: - Medicare offers their members a free annual wellness visit, which is time for you and your primary care provider to discuss and plan for your preventive service needs. Take advantage of this benefit every year! 
-All adults over the age of 72 should receive the recommended pneumonia vaccines. Current USPSTF guidelines recommend a series of two vaccines for the best pneumonia protection.  
-All adults should have a flu vaccine yearly and a tetanus vaccine every 10 years.  
-All adults age 48 and older should receive the shingles vaccines (series of two vaccines). -All adults age 38-68 who are overweight should have a diabetes screening test once every three years. -All adults born between [birth year] and 1965 should be screened once for Hepatitis C. 
-Other screening tests and preventive services for persons with diabetes include: an eye exam to screen for diabetic retinopathy, a kidney function test, a foot exam, and stricter control over your cholesterol.  
-Cardiovascular screening for adults with routine risk involves an electrocardiogram (ECG) at intervals determined by your doctor.  
-Colorectal cancer screenings should be done for adults age 54-65 with no increased risk factors for colorectal cancer. There are a number of acceptable methods of screening for this type of cancer. Each test has its own benefits and drawbacks. Discuss with your doctor what is most appropriate for you during your annual wellness visit. The different tests include: colonoscopy (considered the best screening method), a fecal occult blood test, a fecal DNA test, and sigmoidoscopy. 
 
-A bone mass density test is recommended when a woman turns 65 to screen for osteoporosis. This test is only recommended one time, as a screening. Some providers will use this same test as a disease monitoring tool if you already have osteoporosis. -Breast cancer screenings are recommended every other year for women of normal risk, age 54-69. 
-Cervical cancer screenings for women over age 72 are only recommended with certain risk factors. Here is a list of your current Health Maintenance items (your personalized list of preventive services) with a due date: There are no preventive care reminders to display for this patient.

## 2019-12-12 NOTE — PROGRESS NOTES
Subjective:   50 y.o. female for Well Woman Check. Patient's last menstrual period was 09/01/2019 (lmp unknown). She is feeling well;she has no new complaints. She declines lab work as she states she has just gotten blood work from Dr. Roldan Means office. She is on nortriptyline for migraine. There is an interaction between this and plaquenil. She agrees to change in med    Social History: single partner, . Pertinent past medical hstory: as below. Patient Active Problem List    Diagnosis Date Noted    Mixed connective tissue disease ([facility] [address] 09/12/2014    HTN (hypertension) 12/28/2012    Migraine     Insomnia     Screening for cervical cancer 04/06/2010     Current Outpatient Medications   Medication Sig Dispense Refill           zolpidem CR (AMBIEN CR) 6.25 mg tablet TAKE ONE TABLET BY MOUTH EVERY NIGHT AS NEEDED FOR SLEEP 30 Tab 4    gabapentin (NEURONTIN) 300 mg capsule Take 1 Cap by mouth three (3) times daily as needed for Pain (postherpetic neuralgia). (Patient taking differently: Take 300 mg by mouth two (2) times a day.) 90 Cap 0    nortriptyline (PAMELOR) 25 mg capsule TAKE 1 CAPSULE NIGHTLY 90 Cap 1    fluticasone propionate (FLONASE) 50 mcg/actuation nasal spray 2 Sprays by Both Nostrils route daily. 3 Bottle 1    metoprolol succinate (TOPROL-XL) 50 mg XL tablet Take 1 Tab by mouth daily. 90 Tab 3    promethazine (PHENERGAN) 25 mg tablet Take 1 Tab by mouth every eight (8) hours as needed for Nausea. 90 Tab 1    methotrexate (RHEUMATREX) 2.5 mg tablet       triamcinolone acetonide (KENALOG) 0.1 % topical cream Apply  to affected area every twelve (12) hours as needed for Skin Irritation. use thin layer 15 g 0    LORATADINE (CLARITIN PO) Take  by mouth.  meloxicam (MOBIC) 7.5 mg tablet       FOLIC ACID PO Take  by mouth.  multivitamin (ONE A DAY) tablet Take 1 Tab by mouth daily.  hydroxychloroquine (PLAQUENIL) 200 mg tablet Take 200 mg by mouth two (2) times a day.  butalbital-acetaminophen-caffeine (FIORICET) -40 mg per tablet Take 1 Tab by mouth. No Known Allergies  Past Medical History:   Diagnosis Date    Insomnia     Migraine     Screening for cervical cancer 4/6/2010     Past Surgical History:   Procedure Laterality Date    BREAST SURGERY PROCEDURE UNLISTED  2014    right breast bx    HX APPENDECTOMY  7/1/09    HX CHOLECYSTECTOMY  2001    HX GYN  2012    ceserean      Family History   Problem Relation Age of Onset    Alcohol abuse Father     Diabetes Mother     Lung Disease Mother         COPD    Crohn's Disease Maternal Grandmother      Social History     Tobacco Use    Smoking status: Never Smoker    Smokeless tobacco: Never Used   Substance Use Topics    Alcohol use: Yes     Alcohol/week: 0.0 standard drinks     Comment: occasionally        ROS:  Feeling well. No dyspnea or chest pain on exertion. No abdominal pain, change in bowel habits, black or bloody stools. No urinary tract symptoms. GYN ROS: normal menses, no abnormal bleeding, pelvic pain or discharge, no breast pain or new or enlarging lumps on self exam. No neurological complaints. Objective:     Visit Vitals  /85   Pulse 78   Temp 98.4 °F (36.9 °C) (Oral)   Resp 18   Ht 5' 6\" (1.676 m)   Wt 210 lb (95.3 kg)   LMP 09/01/2019 (LMP Unknown)   SpO2 95%   BMI 33.89 kg/m²     The patient appears well, alert, oriented x 3, in no distress. ENT normal.  Neck supple. No adenopathy or thyromegaly. MARIE. Lungs are clear, good air entry, no wheezes, rhonchi or rales. S1 and S2 normal, no murmurs, regular rate and rhythm. Abdomen soft without tenderness, guarding, mass or organomegaly. Extremities show no edema, normal peripheral pulses. Neurological is normal, no focal findings.     BREAST EXAM: breasts appear normal, no suspicious masses, no skin or nipple changes or axillary nodes    PELVIC EXAM: VULVA: normal appearing vulva with no masses, tenderness or lesions, VAGINA: normal appearing vagina with normal color and discharge, no lesions, CERVIX: normal appearing cervix without discharge or lesions, UTERUS: uterus is normal size, shape, consistency and nontender, ADNEXA: normal adnexa in size, nontender and no masses    Assessment/Plan:   well woman  mammogram  pap smear  additional lab tests per orders  return annually or prn    ICD-10-CM ICD-9-CM    1. Well woman exam with routine gynecological exam Z01.419 V72.31 PAP, LB, RFX HPV KLHGS([id number])   2. Other screening mammogram Z12.31 V76.12 ARIANNE MAMMO BI SCREENING INCL CAD   . As above,   above all stable unless otherwise noted  Mammogram as ordered  Pap  DC nortriptyline  Topamax as ordered; side effects reviewed  Follow-up and Dispositions    · Return in about 6 months (around 6/12/2020) for migraine/bp. An After Visit Summary was printed and given to the patient. This has been fully explained to the patient, who indicates understanding.

## 2019-12-12 NOTE — PROGRESS NOTES
Dariana Bianchi is a  50 y.o. female presents today for office visit for a wellness exam.    1. Have you been to the ER, urgent care clinic or hospitalized since your last visit? NO    2. Have you seen or consulted any other health care providers outside of the [address] [place] [address] since your last visit (Include any pap smears or colon screening)? NO

## 2020-04-09 ENCOUNTER — PATIENT MESSAGE (OUTPATIENT)
Dept: FAMILY MEDICINE CLINIC | Age: 50
End: 2020-04-09

## 2020-04-09 DIAGNOSIS — F51.01 PRIMARY INSOMNIA: ICD-10-CM

## 2020-04-15 NOTE — TELEPHONE ENCOUNTER
----- Message from [facility]. [employer] sent at 4/14/2020  7:21 PM EDT -----  Regarding: RE: Prescription Question  Contact: [phone]  Any updates on this.  Im getting very low

## 2020-04-16 RX ORDER — ZOLPIDEM TARTRATE 6.25 MG/1
TABLET, FILM COATED, EXTENDED RELEASE ORAL
Qty: 30 TAB | Refills: 4 | Status: SHIPPED | OUTPATIENT
Start: 2020-04-16 | End: 2020-08-26

## 2020-04-21 RX ORDER — METOPROLOL SUCCINATE 50 MG/1
TABLET, EXTENDED RELEASE ORAL
Qty: 90 TAB | Refills: 3 | Status: SHIPPED | OUTPATIENT
Start: 2020-04-21 | End: 2021-04-16

## 2020-06-01 ENCOUNTER — HOSPITAL ENCOUNTER (OUTPATIENT)
Dept: MAMMOGRAPHY | Age: 50
Discharge: HOME | End: 2020-06-01
Attending: FAMILY MEDICINE
Payer: COMMERCIAL

## 2020-06-01 DIAGNOSIS — Z12.31 OTHER SCREENING MAMMOGRAM: ICD-10-CM

## 2020-06-01 PROCEDURE — 77063 BREAST TOMOSYNTHESIS BI: CPT

## 2020-07-07 RX ORDER — TOPIRAMATE 25 MG/1
TABLET ORAL
Qty: 30 TAB | Refills: 5 | Status: SHIPPED | OUTPATIENT
Start: 2020-07-07 | End: 2020-12-29

## 2020-07-23 ENCOUNTER — PATIENT MESSAGE (OUTPATIENT)
Dept: FAMILY MEDICINE CLINIC | Age: 50
End: 2020-07-23

## 2020-07-23 ENCOUNTER — OFFICE VISIT (OUTPATIENT)
Dept: FAMILY MEDICINE CLINIC | Age: 50
End: 2020-07-23

## 2020-07-23 VITALS
WEIGHT: 196 LBS | HEIGHT: 66 IN | DIASTOLIC BLOOD PRESSURE: 78 MMHG | HEART RATE: 75 BPM | RESPIRATION RATE: 17 BRPM | TEMPERATURE: 98.2 F | SYSTOLIC BLOOD PRESSURE: 122 MMHG | OXYGEN SATURATION: 98 % | BODY MASS INDEX: 31.5 KG/M2

## 2020-07-23 DIAGNOSIS — M25.562 ACUTE PAIN OF LEFT KNEE: ICD-10-CM

## 2020-07-23 DIAGNOSIS — Z23 NEED FOR SHINGLES VACCINE: Primary | ICD-10-CM

## 2020-07-23 DIAGNOSIS — I10 ESSENTIAL HYPERTENSION: Primary | ICD-10-CM

## 2020-07-23 DIAGNOSIS — G43.709 CHRONIC MIGRAINE WITHOUT AURA WITHOUT STATUS MIGRAINOSUS, NOT INTRACTABLE: ICD-10-CM

## 2020-07-23 RX ORDER — AMITRIPTYLINE HYDROCHLORIDE 25 MG/1
TABLET, FILM COATED ORAL
COMMUNITY
Start: 2020-07-14

## 2020-07-23 NOTE — PATIENT INSTRUCTIONS
DASH Diet: Care Instructions  Your Care Instructions     The DASH diet is an eating plan that can help lower your blood pressure. DASH stands for Dietary Approaches to Stop Hypertension. Hypertension is high blood pressure. The DASH diet focuses on eating foods that are high in calcium, potassium, and magnesium. These nutrients can lower blood pressure. The foods that are highest in these nutrients are fruits, vegetables, low-fat dairy products, nuts, seeds, and legumes. But taking calcium, potassium, and magnesium supplements instead of eating foods that are high in those nutrients does not have the same effect. The DASH diet also includes whole grains, fish, and poultry. The DASH diet is one of several lifestyle changes your doctor may recommend to lower your high blood pressure. Your doctor may also want you to decrease the amount of sodium in your diet. Lowering sodium while following the DASH diet can lower blood pressure even further than just the DASH diet alone. Follow-up care is a key part of your treatment and safety. Be sure to make and go to all appointments, and call your doctor if you are having problems. It's also a good idea to know your test results and keep a list of the medicines you take. How can you care for yourself at home? Following the DASH diet  · Eat 4 to 5 servings of fruit each day. A serving is 1 medium-sized piece of fruit, ½ cup chopped or canned fruit, 1/4 cup dried fruit, or 4 ounces (½ cup) of fruit juice. Choose fruit more often than fruit juice. · Eat 4 to 5 servings of vegetables each day. A serving is 1 cup of lettuce or raw leafy vegetables, ½ cup of chopped or cooked vegetables, or 4 ounces (½ cup) of vegetable juice. Choose vegetables more often than vegetable juice. · Get 2 to 3 servings of low-fat and fat-free dairy each day. A serving is 8 ounces of milk, 1 cup of yogurt, or 1 ½ ounces of cheese. · Eat 6 to 8 servings of grains each day.  A serving is 1 slice of bread, 1 ounce of dry cereal, or ½ cup of cooked rice, pasta, or cooked cereal. Try to choose whole-grain products as much as possible. · Limit lean meat, poultry, and fish to 2 servings each day. A serving is 3 ounces, about the size of a deck of cards. · Eat 4 to 5 servings of nuts, seeds, and legumes (cooked dried beans, lentils, and split peas) each week. A serving is 1/3 cup of nuts, 2 tablespoons of seeds, or ½ cup of cooked beans or peas. · Limit fats and oils to 2 to 3 servings each day. A serving is 1 teaspoon of vegetable oil or 2 tablespoons of salad dressing. · Limit sweets and added sugars to 5 servings or less a week. A serving is 1 tablespoon jelly or jam, ½ cup sorbet, or 1 cup of lemonade. · Eat less than 2,300 milligrams (mg) of sodium a day. If you limit your sodium to 1,500 mg a day, you can lower your blood pressure even more. Tips for success  · Start small. Do not try to make dramatic changes to your diet all at once. You might feel that you are missing out on your favorite foods and then be more likely to not follow the plan. Make small changes, and stick with them. Once those changes become habit, add a few more changes. · Try some of the following:  ? Make it a goal to eat a fruit or vegetable at every meal and at snacks. This will make it easy to get the recommended amount of fruits and vegetables each day. ? Try yogurt topped with fruit and nuts for a snack or healthy dessert. ? Add lettuce, tomato, cucumber, and onion to sandwiches. ? Combine a ready-made pizza crust with low-fat mozzarella cheese and lots of vegetable toppings. Try using tomatoes, squash, spinach, broccoli, carrots, cauliflower, and onions. ? Have a variety of cut-up vegetables with a low-fat dip as an appetizer instead of chips and dip. ? Sprinkle sunflower seeds or chopped almonds over salads. Or try adding chopped walnuts or almonds to cooked vegetables.   ? Try some vegetarian meals using beans and peas. Add garbanzo or kidney beans to salads. Make burritos and tacos with mashed elkins beans or black beans. Where can you learn more? Go to http://paco-saturnino.info/  Enter H967 in the search box to learn more about \"DASH Diet: Care Instructions. \"  Current as of: December 16, 2019               Content Version: 12.5  © 3132-6943 [employer]. Care instructions adapted under license by [employer] (which disclaims liability or warranty for this information). If you have questions about a medical condition or this instruction, always ask your healthcare professional. [address] any warranty or liability for your use of this information.

## 2020-07-23 NOTE — PROGRESS NOTES
Chief Complaint   Patient presents with    Migraine    Hypertension       1. Have you been to the ER, urgent care clinic since your last visit? Hospitalized since your last visit? No     2. Have you seen or consulted any other health care providers outside of the [address] [place] [address] since your last visit? Include any pap smears or colon screening.   No

## 2020-07-23 NOTE — PROGRESS NOTES
HPI:  Cori Longo is a 52 y.o. female who presents today with   Chief Complaint   Patient presents with    Migraine    Hypertension    Knee Pain       c/o  Knee pain left for the last  Several weeks  Hyperflexing the knee aggravates sx    Declines lipid check, glucose levels; does this thru her 's job      BP is stable; her migraines are stable; She is on topamax. She has had fewer headaches and she has also lost weight recently. 3 most recent PHQ Screens 9/4/2019   Little interest or pleasure in doing things Not at all   Feeling down, depressed, irritable, or hopeless Not at all   Total Score PHQ 2 0               PMH,  Meds, Allergies, Family History, Social history reviewed      Current Outpatient Medications   Medication Sig Dispense Refill    topiramate (TOPAMAX) 25 mg tablet TAKE ONE TABLET BY MOUTH EVERY NIGHT 30 Tab 5    metoprolol succinate (TOPROL-XL) 50 mg XL tablet TAKE 1 TABLET DAILY 90 Tab 3    zolpidem CR (AMBIEN CR) 6.25 mg tablet TAKE ONE TABLET BY MOUTH EVERY NIGHT AS NEEDED FOR SLEEP 30 Tab 4    gabapentin (NEURONTIN) 300 mg capsule Take 1 Cap by mouth three (3) times daily as needed for Pain (postherpetic neuralgia). (Patient taking differently: Take 300 mg by mouth two (2) times a day.) 90 Cap 0    fluticasone propionate (FLONASE) 50 mcg/actuation nasal spray 2 Sprays by Both Nostrils route daily. 3 Bottle 1    promethazine (PHENERGAN) 25 mg tablet Take 1 Tab by mouth every eight (8) hours as needed for Nausea. 90 Tab 1    methotrexate (RHEUMATREX) 2.5 mg tablet       LORATADINE (CLARITIN PO) Take  by mouth.  meloxicam (MOBIC) 7.5 mg tablet       FOLIC ACID PO Take  by mouth.  multivitamin (ONE A DAY) tablet Take 1 Tab by mouth daily.  hydroxychloroquine (PLAQUENIL) 200 mg tablet Take 200 mg by mouth two (2) times a day.  butalbital-acetaminophen-caffeine (FIORICET) -40 mg per tablet Take 1 Tab by mouth.       amitriptyline (ELAVIL) 25 mg tablet No Known Allergies               Review of Systems   Constitutional: Negative for chills and fever. Respiratory: Negative for shortness of breath and wheezing. Cardiovascular: Negative for chest pain and palpitations. Visit Vitals  /78 (BP 1 Location: Right arm, BP Patient Position: Sitting)   Pulse 75   Temp 98.2 °F (36.8 °C) (Oral)   Resp 17   Ht 5' 6\" (1.676 m)   Wt 196 lb (88.9 kg)   SpO2 98%   BMI 31.64 kg/m²     Physical Exam  Vitals signs and nursing note reviewed. Constitutional:       Appearance: Normal appearance. She is not ill-appearing or diaphoretic. Cardiovascular:      Rate and Rhythm: Normal rate and regular rhythm. Heart sounds: No murmur. No friction rub. No gallop. Pulmonary:      Effort: No respiratory distress. Breath sounds: Normal breath sounds. No wheezing or rales. Neurological:      Mental Status: She is alert. Left knee; no edema; No joint laxity with varus/valgus stress    Visit Vitals  /78 (BP 1 Location: Right arm, BP Patient Position: Sitting)   Pulse 75   Temp 98.2 °F (36.8 °C) (Oral)   Resp 17   Ht 5' 6\" (1.676 m)   Wt 196 lb (88.9 kg)   SpO2 98%   BMI 31.64 kg/m²         Assessment/Plan:    Diagnoses and all orders for this visit:    1. Essential hypertension- stable    2. Acute pain of left knee- not controlled  -     REFERRAL TO ORTHOPEDICS    3. Chronic migraine without aura without status migrainosus, not intractable-stable      As above,    treatment plan as listed below  Orders Placed This Encounter    REFERRAL TO ORTHOPEDICS    amitriptyline (ELAVIL) 25 mg tablet     Refilled meds needed  Ortho consult  Follow-up and Dispositions    · Return in about 4 months (around 11/23/2020) for htn. This has been fully explained to the patient, who indicates understanding. An After Visit Summary was printed and given to the patient.           Follow-up and Dispositions    · Return in about 4 months (around 11/23/2020) for htn.             Villa Crum MD

## 2020-08-14 NOTE — TELEPHONE ENCOUNTER
From: Cori Longo  To: Jose Carlos Kern MD  Sent: 7/23/2020 1:55 PM EDT  Subject: Prescription Question    Can you write a rx for the shingle vaccine? The pharmacist said I cant get it without a rx bc Im not old enough. Apparently Im at greater risk to get shingles bc Ive had it last year  Sorry.  I meant to ask at our appointment

## 2020-08-20 ENCOUNTER — PATIENT MESSAGE (OUTPATIENT)
Dept: FAMILY MEDICINE CLINIC | Age: 50
End: 2020-08-20

## 2020-08-20 NOTE — TELEPHONE ENCOUNTER
From: Shantel Mas  To: [facility]  Sent: 8/20/2020 11:51 AM EDT  Subject: Prescription Question    Dr Owen Jasso sent me a message that she sent a rx for a shingles shot to [facility] on [address] but they do not have it. They have checked twice now in case it was pending. Can you resend it? OTOLARYNGOLOGY CONSULTATION    This is a 65 year old male presenting for otolaryngology consultation at the request of  with complaints referable to his ear.    HISTORY OF PRESENT ILLNESS:  Marco complains of pain and crusting in both ears for the past couple of days he was seen in urgent care yesterday treated with an ear wick in his right ear and Cipro and Ciprodex drops were prescribed he did not have the Ciprodex drops filled because of the expense he complains of hearing loss in both ears denies dizziness he has not had recurring infections of his ears. He is an insulin-dependent diabetic    Past Medical History:   Diagnosis Date   • Acute bronchitis    • Arthritis    • Carpal tunnel syndrome, bilateral    • Cataract     bilat   • Diabetes mellitus (CMS/HCC)    • Diabetic neuropathy (CMS/HCC)     hands and feet   • Diabetic peripheral neuropathy (CMS/HCC)    • Edentulous    • Emphysema (CMS/HCC)    • Glaucoma     lt eye   • Left shoulder pain    • Sleep apnea      testing sept 2012, \"did not stop breathing enought to need machine\"         REVIEW OF SYSTEMS:  Denies fever chills congestion he does get occasion short of breath denies chest pain reflux is chronic back pain denies skin rash headache swollen glands    Social History     Social History   • Marital status: Single     Spouse name: N/A   • Number of children: N/A   • Years of education: N/A     Social History Main Topics   • Smoking status: Current Every Day Smoker     Packs/day: 1.00     Types: Cigarettes   • Smokeless tobacco: Never Used   • Alcohol use 0.0 oz/week      Comment: seldom   • Drug use: No   • Sexual activity: Not Currently     Other Topics Concern   • None     Social History Narrative   • None       Family History   Problem Relation Age of Onset   • Kidney disease Father    • Substance abuse Father      alcohol   • Seizures Brother    • Diabetes Maternal Aunt        Patient Active Problem List   Diagnosis   • Hypersomnia with  sleep apnea, unspecified   • Edentulous   • Arthritis   • Carpal tunnel syndrome   • Active smoker   • Chronic radicular pain of lower back   • Chronic left shoulder pain   • Chronic obstructive pulmonary disease (CMS/HCC)   • Diabetic ulcer of left foot associated with type 2 diabetes mellitus (CMS/HCC)   • Type 2 diabetes, controlled, with peripheral neuropathy (CMS/HCC)   • Generalized hyperhidrosis   • ED (erectile dysfunction) of organic origin       PHYSICAL EXAMINATION:  Visit Vitals  /62   Pulse 64   Wt 88 kg   SpO2 98%   BMI 28.65 kg/m²     General: No acute distress, communicates well, quite disheveled  Face: No lesions, no TMJ tenderness.  Oral cavity: Edentulous  Pharynx: Oral, nasopharynx, hypopharynx injected without exudate or lesion. Vocal cords are mobile.   Neck: Supple, without palpable lymphadenopathy, thyroid is normal to palpation, carotid pulses are equal and strong.  Nose: Nasal septum is midline, there is no sign of purulence or polyps. Turbinates are normal.  Ears: Microscopic examination the ears reveals normal tympanic membranes he does have a bilateral external otitis we did remove the wick from his right ear under the microscope and further debrided his ear  Neurological: Facial nerves are intact and symmetrical. Cawthorne maneuvers do not cause dizziness nor nystagmus. Romberg is negative.     IMPRESSION:  Bilateral external otitis in an insulin dependent diabetic    PLAN:  We discussed the above in detail as he cannot obtain the Ciprodex we will put him on Cortisporin otic suspension continue Cipro return to see me on Monday

## 2020-08-26 DIAGNOSIS — F51.01 PRIMARY INSOMNIA: ICD-10-CM

## 2020-08-26 RX ORDER — ZOLPIDEM TARTRATE 6.25 MG/1
TABLET, FILM COATED, EXTENDED RELEASE ORAL
Qty: 30 TAB | Refills: 3 | Status: SHIPPED | OUTPATIENT
Start: 2020-08-26 | End: 2020-12-29

## 2020-12-29 DIAGNOSIS — F51.01 PRIMARY INSOMNIA: ICD-10-CM

## 2020-12-29 RX ORDER — ZOLPIDEM TARTRATE 6.25 MG/1
TABLET, FILM COATED, EXTENDED RELEASE ORAL
Qty: 30 TAB | Refills: 2 | Status: SHIPPED | OUTPATIENT
Start: 2020-12-29 | End: 2021-03-23 | Stop reason: SDUPTHER

## 2020-12-29 RX ORDER — TOPIRAMATE 25 MG/1
TABLET ORAL
Qty: 30 TAB | Refills: 4 | Status: SHIPPED | OUTPATIENT
Start: 2020-12-29 | End: 2021-05-03 | Stop reason: SDUPTHER

## 2021-01-25 ENCOUNTER — OFFICE VISIT (OUTPATIENT)
Dept: FAMILY MEDICINE CLINIC | Age: 51
End: 2021-01-25
Payer: COMMERCIAL

## 2021-01-25 VITALS
OXYGEN SATURATION: 96 % | SYSTOLIC BLOOD PRESSURE: 130 MMHG | DIASTOLIC BLOOD PRESSURE: 88 MMHG | HEIGHT: 66 IN | HEART RATE: 79 BPM | RESPIRATION RATE: 18 BRPM | TEMPERATURE: 96.8 F | BODY MASS INDEX: 33.88 KG/M2 | WEIGHT: 210.8 LBS

## 2021-01-25 DIAGNOSIS — M06.9 RHEUMATOID ARTHRITIS, INVOLVING UNSPECIFIED SITE, UNSPECIFIED WHETHER RHEUMATOID FACTOR PRESENT (HCC): ICD-10-CM

## 2021-01-25 DIAGNOSIS — L98.9 SKIN LESION OF FACE: ICD-10-CM

## 2021-01-25 DIAGNOSIS — I10 ESSENTIAL HYPERTENSION: ICD-10-CM

## 2021-01-25 DIAGNOSIS — G43.709 CHRONIC MIGRAINE WITHOUT AURA WITHOUT STATUS MIGRAINOSUS, NOT INTRACTABLE: Primary | ICD-10-CM

## 2021-01-25 DIAGNOSIS — Z12.11 SCREENING FOR COLON CANCER: ICD-10-CM

## 2021-01-25 DIAGNOSIS — Z23 NEEDS FLU SHOT: ICD-10-CM

## 2021-01-25 PROCEDURE — 90471 IMMUNIZATION ADMIN: CPT | Performed by: FAMILY MEDICINE

## 2021-01-25 PROCEDURE — 99214 OFFICE O/P EST MOD 30 MIN: CPT | Performed by: FAMILY MEDICINE

## 2021-01-25 PROCEDURE — 90686 IIV4 VACC NO PRSV 0.5 ML IM: CPT | Performed by: FAMILY MEDICINE

## 2021-01-25 NOTE — PROGRESS NOTES
Miguel Cuellar presents today for No chief complaint on file. Is someone accompanying this pt? NO    Is the patient using any DME equipment during OV? NO    Depression Screening:  3 most recent PHQ Screens 9/4/2019   Little interest or pleasure in doing things Not at all   Feeling down, depressed, irritable, or hopeless Not at all   Total Score PHQ 2 0       Learning Assessment:  Learning Assessment 4/27/2015   PRIMARY LEARNER Patient   PRIMARY LANGUAGE ENGLISH   LEARNER PREFERENCE PRIMARY DEMONSTRATION     -   ANSWERED BY patient   RELATIONSHIP SELF       Health Maintenance reviewed and discussed and ordered per Provider. Health Maintenance Due   Topic Date Due    COVID-19 Vaccine (1 of 2) 12/16/1986    Flu Vaccine (1) 09/01/2020    Shingrix Vaccine Age 50> (1 of 2) 12/16/2020    Colorectal Cancer Screening Combo  12/16/2020   . Coordination of Care:  1. Have you been to the ER, urgent care clinic since your last visit? NO Hospitalized since your last visit? NO    2. Have you seen or consulted any other health care providers outside of the [address] [place] [address] since your last visit? Include any pap smears or colon screening. Marnie Moore is a 48 y.o. female who presents for routine immunizations. She denies any symptoms , reactions or allergies that would exclude them from being immunized today. Risks and adverse reactions were discussed and the VIS was given to them. All questions were addressed. She was observed for 15 min post injection. There were no reactions observed. Verbal order for FLU IM received per Kate Campos MD for pt Miguel Cuellar. Order written down and read back to provider for verification prior to completion.

## 2021-01-25 NOTE — PATIENT INSTRUCTIONS
Colon Cancer Screening: Care Instructions Your Care Instructions Colorectal cancer occurs in the colon or rectum. That's the lower part of your digestive system. It is the second-leading cause of cancer deaths in the [place]. It often starts with small growths called polyps in the colon or rectum. Polyps are usually found with screening tests. Depending on the type of test, any polyps found may be removed during the tests. Colorectal cancer usually does not cause symptoms at first. But regular tests can help find it early, before it spreads and becomes harder to treat. Your risk for colorectal cancer gets higher as you get older. Some experts say that adults should start regular screening at age 48 and stop at age 76. Others say to start before age 48 or continue after age 76. Talk with your doctor about your risk and when to start and stop screening. You may have one of several tests. Follow-up care is a key part of your treatment and safety. Be sure to make and go to all appointments, and call your doctor if you are having problems. It's also a good idea to know your test results and keep a list of the medicines you take. What are the main screening tests for colon cancer? The screening tests are: 
Stool tests. These include the guaiac fecal occult blood test (gFOBT), the fecal immunochemical test (FIT), and the combined fecal immunochemical test and stool DNA test (FIT-DNA). These tests check stool samples for signs of cancer. If your test is positive, you will need to have a colonoscopy. Sigmoidoscopy. This test lets your doctor look at the lining of your rectum and the lowest part of your colon. Your doctor uses a lighted tube called a sigmoidoscope. This test can't find cancers or polyps in the upper part of your colon. In some cases, polyps that are found can be removed. But if your doctor finds polyps, you will need to have a colonoscopy to check the upper part of your colon. Colonoscopy. This test lets your doctor look at the lining of your rectum and your entire colon. The doctor uses a thin, flexible tool called a colonoscope. It can also be used to remove polyps or get a tissue sample (biopsy). A less common test is CT colonography (CTC). It's also called virtual colonoscopy. Who should be screened for colorectal cancer? Your risk for colorectal cancer gets higher as you get older. Some experts say that adults should start regular screening at age 48 and stop at age 76. Others say to start before age 48 or continue after age 76. Talk with your doctor about your risk and when to start and stop screening. How often you need screening depends on the type of test you get: 
Stool tests. Every 1 or 2 years for FIT or gFOBT. Every 3 years for sDNA, also called FIT-DNA. Tests that look inside the colon. Every 5 or 10 years for sigmoidoscopy. Every 5 years for CT colonography (virtual colonoscopy). Every 10 years for colonoscopy. Experts agree that people at higher risk may need to be tested sooner. This includes people who have a strong family history of colon cancer. Talk to your doctor about which test is best for you and when to be tested. When should you call for help? Watch closely for changes in your health, and be sure to contact your doctor if: 
  · You have any changes in your bowel habits.  
  · You have any problems. Where can you learn more? Go to http://www.gray.com/ Enter M541 in the search box to learn more about \"Colon Cancer Screening: Care Instructions. \" Current as of: April 29, 2020               Content Version: 12.6 © 0241-5472 [employer]. Care instructions adapted under license by [employer] (which disclaims liability or warranty for this information). If you have questions about a medical condition or this instruction, always ask your healthcare professional. [address] any warranty or liability for your use of this information.

## 2021-01-25 NOTE — PROGRESS NOTES
HPI:  Carlos Keller is a 48 y.o. female who presents today with   Chief Complaint   Patient presents with    Headache     migraines 6 month F/U        Migraines are stable; on topamax and fioricet      Had some right nipple pain; improved after she took a ring piercing out. No bleeding; There is some drainage from the area;  drainage has been present X 10 years; requests an exam of area. Has some skin changes on the left side of her face and wants to see derm; she has known sun sensitivity    Needs colonoscopy. Agrees to a referral    Pt also has HTN; she is compliant with meds. Had recent blood work through her husbands job. Declines blood work to be done here. 3 most recent PHQ Screens 1/25/2021   Little interest or pleasure in doing things Not at all   Feeling down, depressed, irritable, or hopeless Not at all   Total Score PHQ 2 0               PMH,  Meds, Allergies, Family History, Social history reviewed      Current Outpatient Medications   Medication Sig Dispense Refill    topiramate (TOPAMAX) 25 mg tablet TAKE ONE TABLET BY MOUTH ONCE NIGHTLY 30 Tab 4    zolpidem CR (AMBIEN CR) 6.25 mg tablet TAKE ONE TABLET BY MOUTH EVERY NIGHT AT BEDTIME AS NEEDED FOR SLEEP 30 Tab 2    amitriptyline (ELAVIL) 25 mg tablet       metoprolol succinate (TOPROL-XL) 50 mg XL tablet TAKE 1 TABLET DAILY 90 Tab 3    gabapentin (NEURONTIN) 300 mg capsule Take 1 Cap by mouth three (3) times daily as needed for Pain (postherpetic neuralgia). (Patient taking differently: Take 300 mg by mouth two (2) times a day.) 90 Cap 0    fluticasone propionate (FLONASE) 50 mcg/actuation nasal spray 2 Sprays by Both Nostrils route daily. 3 Bottle 1    promethazine (PHENERGAN) 25 mg tablet Take 1 Tab by mouth every eight (8) hours as needed for Nausea. 90 Tab 1    methotrexate (RHEUMATREX) 2.5 mg tablet       LORATADINE (CLARITIN PO) Take  by mouth.  meloxicam (MOBIC) 7.5 mg tablet       FOLIC ACID PO Take  by mouth.       multivitamin (ONE A DAY) tablet Take 1 Tab by mouth daily.  hydroxychloroquine (PLAQUENIL) 200 mg tablet Take 200 mg by mouth two (2) times a day.  butalbital-acetaminophen-caffeine (FIORICET) -40 mg per tablet Take 1 Tab by mouth. No Known Allergies               Review of Systems   Constitutional: Negative for chills and fever. Respiratory: Negative for cough and sputum production. Visit Vitals  /88   Pulse 79   Temp 96.8 °F (36 °C) (Temporal)   Resp 18   Ht 5' 6\" (1.676 m)   Wt 210 lb 12.8 oz (95.6 kg)   SpO2 96%   BMI 34.02 kg/m²     Physical Exam   General appearance: alert, cooperative, no distress, appears stated age  Neck: supple, symmetrical, trachea midline, no adenopathy, thyroid: not enlarged, symmetric, no tenderness/mass/nodules, no carotid bruit and no JVD  Lungs: clear to auscultation bilaterally  Heart: regular rate and rhythm, S1, S2 normal, no murmur, click, rub or gallop    Extremities: extremities normal, atraumatic, no cyanosis or edema    Skin: few pinpoint flesh tone lesions at the right temple; some crusting noted/associated with these lesions    Breast exam: breasts appear normal, no suspicious masses, no skin or nipple changes or axillary nodes. Right nipple appears to be healing. Assessment/Plan:    Diagnoses and all orders for this visit:    1. Chronic migraine without aura without status migrainosus, not intractable    2. Essential hypertension    3. Skin lesion of face  -     REFERRAL TO DERMATOLOGY    4. Rheumatoid arthritis, involving unspecified site, unspecified whether rheumatoid factor present ([facility] [address]    5. Needs flu shot  -     INFLUENZA VIRUS VAC QUAD,SPLIT,PRESV FREE SYRINGE IM    6.  Screening for colon cancer  -     REFERRAL TO GASTROENTEROLOGY      As above,  treatment plan as listed below  Orders Placed This Encounter    Influenza Virus Vaccine QUAD, PF Syr 6 Months + (Flulaval, Fluzone, Fluarix [id number])    REFERRAL TO GASTROENTEROLOGY    REFERRAL TO DERMATOLOGY   monitor nipple; no mass effect; mammo in June    Follow-up and Dispositions    · Return in about 4 months (around 5/25/2021) for well exam.       An After Visit Summary was printed and given to the patient. This has been fully explained to the patient, who indicates understanding.       Follow-up and Dispositions    · Return in about 4 months (around 5/25/2021) for well exam.            Kassie Cordero MD

## 2021-04-16 RX ORDER — METOPROLOL SUCCINATE 50 MG/1
TABLET, EXTENDED RELEASE ORAL
Qty: 90 TAB | Refills: 3 | Status: SHIPPED | OUTPATIENT
Start: 2021-04-16 | End: 2022-04-15

## 2021-05-03 NOTE — TELEPHONE ENCOUNTER
Last Visit: 1/25/21 with MD Miky Peters  Next Appointment: 6/7/21 with MD Miky Peters  Previous Refill Encounter(s): 12/29/20 #30 with 4 refills    Requested Prescriptions     Pending Prescriptions Disp Refills    topiramate (TOPAMAX) 25 mg tablet 90 Tab 1     Sig: Take 1 Tab by mouth nightly.

## 2021-05-04 RX ORDER — TOPIRAMATE 25 MG/1
25 TABLET ORAL
Qty: 90 TAB | Refills: 1 | Status: SHIPPED | OUTPATIENT
Start: 2021-05-04 | End: 2021-12-03

## 2021-06-07 ENCOUNTER — OFFICE VISIT (OUTPATIENT)
Dept: FAMILY MEDICINE CLINIC | Age: 51
End: 2021-06-07
Payer: COMMERCIAL

## 2021-06-07 VITALS
WEIGHT: 209 LBS | OXYGEN SATURATION: 97 % | SYSTOLIC BLOOD PRESSURE: 120 MMHG | DIASTOLIC BLOOD PRESSURE: 72 MMHG | HEART RATE: 69 BPM | BODY MASS INDEX: 33.59 KG/M2 | RESPIRATION RATE: 16 BRPM | HEIGHT: 66 IN | TEMPERATURE: 98.1 F

## 2021-06-07 DIAGNOSIS — Z00.00 WELL WOMAN EXAM (NO GYNECOLOGICAL EXAM): Primary | ICD-10-CM

## 2021-06-07 DIAGNOSIS — Z12.31 OTHER SCREENING MAMMOGRAM: ICD-10-CM

## 2021-06-07 PROCEDURE — 99396 PREV VISIT EST AGE 40-64: CPT | Performed by: FAMILY MEDICINE

## 2021-06-07 RX ORDER — PROMETHAZINE HYDROCHLORIDE 25 MG/1
25 TABLET ORAL
Qty: 90 TABLET | Refills: 1 | Status: SHIPPED | OUTPATIENT
Start: 2021-06-07

## 2021-06-07 NOTE — PROGRESS NOTES
Subjective:   48 y.o. female for Well Woman Check. She is perimenopausal  Social History: has sex with males. Pertinent past medical hstory: as below. Declines blood test as she states she gets these through her rheumatologist and her 's job health incentive Josias Fong  He did not receive her information regarding the colonoscopy and the dermatology referral.  Referrals have been reprinted for the patient so that she can call to schedule her appointments. She has no new complaints. She needs a mammogram order. Patient Active Problem List    Diagnosis Date Noted    Mixed connective tissue disease ([facility] [address] 09/12/2014    HTN (hypertension) 12/28/2012    Migraine     Insomnia     Screening for cervical cancer 04/06/2010     Current Outpatient Medications   Medication Sig Dispense Refill    topiramate (TOPAMAX) 25 mg tablet Take 1 Tab by mouth nightly. 90 Tab 1    metoprolol succinate (TOPROL-XL) 50 mg XL tablet TAKE 1 TABLET DAILY 90 Tab 3    zolpidem CR (AMBIEN CR) 6.25 mg tablet TAKE ONE TABLET BY MOUTH EVERY NIGHT AT BEDTIME AS NEEDED FOR SLEEP 30 Tab 2    amitriptyline (ELAVIL) 25 mg tablet       gabapentin (NEURONTIN) 300 mg capsule Take 1 Cap by mouth three (3) times daily as needed for Pain (postherpetic neuralgia). (Patient taking differently: Take 300 mg by mouth two (2) times a day.) 90 Cap 0    fluticasone propionate (FLONASE) 50 mcg/actuation nasal spray 2 Sprays by Both Nostrils route daily. 3 Bottle 1    promethazine (PHENERGAN) 25 mg tablet Take 1 Tab by mouth every eight (8) hours as needed for Nausea. 90 Tab 1    methotrexate (RHEUMATREX) 2.5 mg tablet       LORATADINE (CLARITIN PO) Take  by mouth.  meloxicam (MOBIC) 7.5 mg tablet       FOLIC ACID PO Take  by mouth.  multivitamin (ONE A DAY) tablet Take 1 Tab by mouth daily.  hydroxychloroquine (PLAQUENIL) 200 mg tablet Take 200 mg by mouth two (2) times a day.       butalbital-acetaminophen-caffeine (FIORICET) -40 mg per tablet Take 1 Tab by mouth. No Known Allergies  Past Medical History:   Diagnosis Date    Insomnia     Migraine     Screening for cervical cancer 4/6/2010     Past Surgical History:   Procedure Laterality Date    HX APPENDECTOMY  7/1/09    HX CHOLECYSTECTOMY  2001    HX GYN  2012    ceserean     UT BREAST SURGERY PROCEDURE UNLISTED  2014    right breast bx     Family History   Problem Relation Age of Onset    Alcohol abuse Father     Diabetes Mother     Lung Disease Mother         COPD    Crohn's Disease Maternal Grandmother      Social History     Tobacco Use    Smoking status: Never Smoker    Smokeless tobacco: Never Used   Substance Use Topics    Alcohol use: Yes     Alcohol/week: 0.0 standard drinks     Comment: occasionally        ROS:  Feeling well. No dyspnea or chest pain on exertion. No abdominal pain, change in bowel habits, black or bloody stools. No urinary tract symptoms. . Menopausal symptoms: none and some irregular periods. . No neurological complaints. Last DEXA scan and T-score:NA  Last Colonoscopy: to be scheduled. Last Mammogram: 6/2020 none    Objective:     Visit Vitals  /72 (BP 1 Location: Left upper arm, BP Patient Position: Sitting, BP Cuff Size: Adult long)   Pulse 69   Temp 98.1 °F (36.7 °C) (Temporal)   Resp 16   Ht 5' 6\" (1.676 m)   Wt 209 lb (94.8 kg)   SpO2 97%   BMI 33.73 kg/m²     The patient appears well, alert, oriented x 3, in no distress. ENT normal.  Neck supple. No adenopathy or thyromegaly. MARIE. Lungs are clear, good air entry, no wheezes, rhonchi or rales. S1 and S2 normal, no murmurs, regular rate and rhythm. Abdomen soft without tenderness, guarding, mass or organomegaly. Extremities show no edema, normal peripheral pulses. Neurological is normal, no focal findings. Assessment/Plan:   well woman  mammogram  return annually or prn  screening colonoscopy referral written    ICD-10-CM ICD-9-CM    1.  Well woman exam (no gynecological exam)  Z00.00 V70.0    2. Other screening mammogram  Z12.31 V76.12 ARIANNE MAMMO BI SCREENING INCL CAD   . As above    Stable  Mammogram order  Follow-up and Dispositions    · Return in about 6 months (around 12/7/2021) for htn. This has been fully explained to the patient, who indicates understanding. An After Visit Summary was printed and given to the patient.

## 2021-06-07 NOTE — PROGRESS NOTES
Chief Complaint   Patient presents with    Annual Wellness Visit       Pt preferred language for health care discussion is english. Is someone accompanying this pt? no    Is the patient using any DME equipment during OV? no    Depression Screening:  3 most recent [facility] Screens 6/7/2021 1/25/2021 9/4/2019 6/27/2019 5/20/2019 3/9/2018 5/2/2017   Little interest or pleasure in doing things Not at all Not at all Not at all Not at all Not at all Not at all Not at all   Feeling down, depressed, irritable, or hopeless Not at all Not at all Not at all Not at all Not at all Not at all Not at all   Total Score PHQ 2 0 0 0 0 0 0 0       Learning Assessment:  Learning Assessment 4/27/2015 12/28/2012   PRIMARY LEARNER Patient Patient   PRIMARY LANGUAGE ENGLISH ENGLISH   LEARNER PREFERENCE PRIMARY DEMONSTRATION DEMONSTRATION     - LISTENING   ANSWERED BY patient patient   [address] Maintenance reviewed and discussed per provider. Yes        Advance Directive:  1. Do you have an advance directive in place? Patient Reply:no    2. If not, would you like material regarding how to put one in place? Patient Reply: no    Coordination of Care:  1. Have you been to the ER, urgent care clinic since your last visit? Hospitalized since your last visit? no    2. Have you seen or consulted any other health care providers outside of the [address] [place] [address] since your last visit? Include any pap smears or colon screening.  no

## 2021-06-07 NOTE — PATIENT INSTRUCTIONS

## 2021-06-16 DIAGNOSIS — F51.01 PRIMARY INSOMNIA: ICD-10-CM

## 2021-06-16 RX ORDER — ZOLPIDEM TARTRATE 6.25 MG/1
TABLET, FILM COATED, EXTENDED RELEASE ORAL
Qty: 30 TABLET | Refills: 2 | Status: CANCELLED | OUTPATIENT
Start: 2021-06-16

## 2021-06-21 DIAGNOSIS — F51.01 PRIMARY INSOMNIA: ICD-10-CM

## 2021-06-21 NOTE — TELEPHONE ENCOUNTER
[facility] Ambien CR as 6/14/21 for a 30 d/s.      Last Visit: 6/7/21 with MD Catherine Botello  Next Appointment: 12/8/21 with MD Catherine Botello  Previous Refill Encounter(s): 4/15/21 #30 with 2 refills    Requested Prescriptions     Pending Prescriptions Disp Refills    zolpidem CR (AMBIEN CR) 6.25 mg tablet 30 Tablet 2     Sig: TAKE ONE TABLET BY MOUTH EVERY NIGHT AT BEDTIME AS NEEDED FOR SLEEP

## 2021-06-24 ENCOUNTER — HOSPITAL ENCOUNTER (OUTPATIENT)
Dept: MAMMOGRAPHY | Age: 51
Discharge: HOME | End: 2021-06-24
Attending: FAMILY MEDICINE
Payer: COMMERCIAL

## 2021-06-24 DIAGNOSIS — Z12.31 OTHER SCREENING MAMMOGRAM: ICD-10-CM

## 2021-06-24 PROCEDURE — 77063 BREAST TOMOSYNTHESIS BI: CPT

## 2021-06-24 RX ORDER — ZOLPIDEM TARTRATE 6.25 MG/1
TABLET, FILM COATED, EXTENDED RELEASE ORAL
Qty: 30 TABLET | Refills: 2 | OUTPATIENT
Start: 2021-06-24

## 2021-07-09 DIAGNOSIS — F51.01 PRIMARY INSOMNIA: ICD-10-CM

## 2021-07-14 ENCOUNTER — PATIENT MESSAGE (OUTPATIENT)
Dept: FAMILY MEDICINE CLINIC | Age: 51
End: 2021-07-14

## 2021-07-15 RX ORDER — ZOLPIDEM TARTRATE 6.25 MG/1
TABLET, FILM COATED, EXTENDED RELEASE ORAL
Qty: 30 TABLET | Refills: 1 | Status: SHIPPED | OUTPATIENT
Start: 2021-07-15 | End: 2021-09-07

## 2021-07-16 NOTE — TELEPHONE ENCOUNTER
From: Severino Ann  To: Diana Cisse MD  Sent: 7/14/2021 1:29 PM EDT  Subject: Prescription Question    Can you please refill my ambien. I asked for it when I picked up the last one so I wouldnt run out but was told it was too soon. I explained by law I cant pick it up early anyway but wanted it on file to avoid this exact situation.  I will be out on Friday

## 2021-09-03 DIAGNOSIS — F51.01 PRIMARY INSOMNIA: ICD-10-CM

## 2021-09-03 RX ORDER — ZOLPIDEM TARTRATE 6.25 MG/1
TABLET, FILM COATED, EXTENDED RELEASE ORAL
Qty: 30 TABLET | Refills: 1 | Status: CANCELLED | OUTPATIENT
Start: 2021-09-03

## 2021-09-04 DIAGNOSIS — F51.01 PRIMARY INSOMNIA: ICD-10-CM

## 2021-09-07 NOTE — TELEPHONE ENCOUNTER
[facility] date for Ambien as 8/12/21 for a 30 d/s.      Last Visit: 6/7/21 with MD Dorothy Herr  Next Appointment: 12/8/21 with MD Dorothy Herr  Previous Refill Encounter(s): 7/15/21 #30 with 1 refill    Requested Prescriptions     Pending Prescriptions Disp Refills    zolpidem CR (AMBIEN CR) 6.25 mg tablet [Pharmacy Med Name: ZOLPIDEM TART ER 6.25 MG TAB] 30 Tablet 1     Sig: TAKE ONE TABLET BY MOUTH EVERY NIGHT AT BEDTIME AS NEEDED FOR SLEEP

## 2021-09-10 RX ORDER — ZOLPIDEM TARTRATE 6.25 MG/1
TABLET, FILM COATED, EXTENDED RELEASE ORAL
Qty: 30 TABLET | Refills: 1 | Status: SHIPPED | OUTPATIENT
Start: 2021-09-10 | End: 2021-11-13

## 2021-11-09 DIAGNOSIS — F51.01 PRIMARY INSOMNIA: ICD-10-CM

## 2021-11-13 RX ORDER — ZOLPIDEM TARTRATE 6.25 MG/1
TABLET, FILM COATED, EXTENDED RELEASE ORAL
Qty: 30 TABLET | Refills: 0 | Status: SHIPPED | OUTPATIENT
Start: 2021-11-13 | End: 2021-12-08 | Stop reason: SDUPTHER

## 2021-12-03 RX ORDER — TOPIRAMATE 25 MG/1
TABLET ORAL
Qty: 30 TABLET | Refills: 1 | Status: SHIPPED | OUTPATIENT
Start: 2021-12-03 | End: 2021-12-08 | Stop reason: SDUPTHER

## 2021-12-08 ENCOUNTER — OFFICE VISIT (OUTPATIENT)
Dept: FAMILY MEDICINE CLINIC | Age: 51
End: 2021-12-08
Payer: COMMERCIAL

## 2021-12-08 VITALS
TEMPERATURE: 97.5 F | OXYGEN SATURATION: 97 % | WEIGHT: 206.4 LBS | HEIGHT: 66 IN | HEART RATE: 71 BPM | SYSTOLIC BLOOD PRESSURE: 129 MMHG | DIASTOLIC BLOOD PRESSURE: 73 MMHG | RESPIRATION RATE: 16 BRPM | BODY MASS INDEX: 33.17 KG/M2

## 2021-12-08 DIAGNOSIS — I10 ESSENTIAL HYPERTENSION: Primary | ICD-10-CM

## 2021-12-08 DIAGNOSIS — F51.01 PRIMARY INSOMNIA: ICD-10-CM

## 2021-12-08 PROCEDURE — 99213 OFFICE O/P EST LOW 20 MIN: CPT | Performed by: FAMILY MEDICINE

## 2021-12-08 RX ORDER — ZOLPIDEM TARTRATE 6.25 MG/1
6.25 TABLET, FILM COATED, EXTENDED RELEASE ORAL
Qty: 30 TABLET | Refills: 5 | Status: SHIPPED | OUTPATIENT
Start: 2021-12-08 | End: 2022-06-08 | Stop reason: SDUPTHER

## 2021-12-08 RX ORDER — TOPIRAMATE 25 MG/1
25 TABLET ORAL
Qty: 30 TABLET | Refills: 5 | Status: SHIPPED | OUTPATIENT
Start: 2021-12-08 | End: 2022-08-12

## 2021-12-08 NOTE — PATIENT INSTRUCTIONS
Learning About Diuretics for High Blood Pressure  Overview  Diuretics help to lower blood pressure. This reduces your risk of a heart attack and stroke. It also reduces your risk of kidney disease. Diuretics cause your kidneys to remove sodium and water. They also relax the blood vessel walls. These help lower your blood pressure. Examples  · Chlorthalidone  · Hydrochlorothiazide  Possible side effects  There are some common side effects. They are:  · Too little potassium. · Feeling dizzy. · Rash. · Urinating a lot. · High blood sugar. (But this is not common.)  You may have other side effects. Check the information that comes with your medicine. What to know about taking this medicine  · You may take other medicines for blood pressure. Diuretics can help those work better. They can also prevent extra fluid in your body. · You may need to take potassium pills. Ask your doctor about this. · You may need blood tests to check on your health. For example, you may have tests to check your kidneys and your potassium level. · Take your medicines exactly as prescribed. Call your doctor if you think you are having a problem with your medicine. · Check with your doctor or pharmacist before you use any other medicines. This includes over-the-counter medicines. Make sure your doctor knows all of the medicines, vitamins, herbal products, and supplements you take. Taking some medicines together can cause problems. Where can you learn more? Go to http://www.gray.com/  Enter U053 in the search box to learn more about \"Learning About Diuretics for High Blood Pressure. \"  Current as of: April 29, 2021               Content Version: 13.0  © 2006-2021 [employer]. Care instructions adapted under license by [employer] (which disclaims liability or warranty for this information).  If you have questions about a medical condition or this instruction, always ask your healthcare professional. [address] any warranty or liability for your use of this information.

## 2021-12-08 NOTE — PROGRESS NOTES
HPI:  Estephania Carrasco is a 48 y.o. female who presents today with   Chief Complaint   Patient presents with    Hypertension        Has been well; She has lost weight. On metoprolol XL 50 mg daily. BP is stable. Rheumatology status is controlled. She has chronic insomnia- she is on [place]; needs a refill on med    Also will soon need a refill on topamax. Declines blood tests       3 most recent PHQ Screens 12/8/2021   Little interest or pleasure in doing things Not at all   Feeling down, depressed, irritable, or hopeless Not at all   Total Score PHQ 2 0               PMH,  Meds, Allergies, Family History, Social history reviewed      Current Outpatient Medications   Medication Sig Dispense Refill    topiramate (TOPAMAX) 25 mg tablet Take 1 Tablet by mouth nightly. 30 Tablet 5    zolpidem CR (AMBIEN CR) 6.25 mg tablet Take 1 Tablet by mouth nightly as needed for Sleep. Max Daily Amount: 6.25 mg. 30 Tablet 5    promethazine (PHENERGAN) 25 mg tablet Take 1 Tablet by mouth every eight (8) hours as needed for Nausea. 90 Tablet 1    BLACK COHOSH PO Take  by mouth.  metoprolol succinate (TOPROL-XL) 50 mg XL tablet TAKE 1 TABLET DAILY 90 Tab 3    amitriptyline (ELAVIL) 25 mg tablet       gabapentin (NEURONTIN) 300 mg capsule Take 1 Cap by mouth three (3) times daily as needed for Pain (postherpetic neuralgia). (Patient taking differently: Take 300 mg by mouth two (2) times a day.) 90 Cap 0    fluticasone propionate (FLONASE) 50 mcg/actuation nasal spray 2 Sprays by Both Nostrils route daily. 3 Bottle 1    methotrexate (RHEUMATREX) 2.5 mg tablet       LORATADINE (CLARITIN PO) Take  by mouth.  meloxicam (MOBIC) 7.5 mg tablet       FOLIC ACID PO Take  by mouth.  multivitamin (ONE A DAY) tablet Take 1 Tab by mouth daily.  hydroxychloroquine (PLAQUENIL) 200 mg tablet Take 200 mg by mouth two (2) times a day.       butalbital-acetaminophen-caffeine (FIORICET) -40 mg per tablet Take 1 Tab by mouth. No Known Allergies               ROS   As per HPI      Visit Vitals  /73 (BP 1 Location: Left upper arm, BP Patient Position: Sitting)   Pulse 71   Temp 97.5 °F (36.4 °C) (Temporal)   Resp 16   Ht 5' 6\" (1.676 m)   Wt 206 lb 6.4 oz (93.6 kg)   SpO2 97%   BMI 33.31 kg/m²     Physical Exam    Visit Vitals  /73 (BP 1 Location: Left upper arm, BP Patient Position: Sitting)   Pulse 71   Temp 97.5 °F (36.4 °C) (Temporal)   Resp 16   Ht 5' 6\" (1.676 m)   Wt 206 lb 6.4 oz (93.6 kg)   SpO2 97%   BMI 33.31 kg/m²     General appearance: alert, cooperative, no distress, appears stated age  Neck: supple, symmetrical, trachea midline, no adenopathy, thyroid: not enlarged, symmetric, no tenderness/mass/nodules, no carotid bruit and no JVD  Lungs: clear to auscultation bilaterally  Heart: regular rate and rhythm, S1, S2 normal, no murmur, click, rub or gallop  Extremities: extremities normal, atraumatic, no cyanosis or edema         Follow-up and Dispositions    · Return in about 6 months (around 6/8/2022) for well exam.        Lab Results   Component Value Date/Time    Cholesterol, total 227 (H) 12/06/2016 10:57 AM    HDL Cholesterol 71 (H) 12/06/2016 10:57 AM    LDL, calculated 116.8 (H) 12/06/2016 10:57 AM    VLDL, calculated 39.2 12/06/2016 10:57 AM    Triglyceride 196 (H) 12/06/2016 10:57 AM    CHOL/HDL Ratio 3.2 12/06/2016 10:57 AM     Lab Results   Component Value Date/Time    Sodium 138 12/06/2016 10:57 AM    Potassium 4.4 12/06/2016 10:57 AM    Chloride 104 12/06/2016 10:57 AM    CO2 24 12/06/2016 10:57 AM    Anion gap 10 12/06/2016 10:57 AM    Glucose 87 12/06/2016 10:57 AM    BUN 11 12/06/2016 10:57 AM    Creatinine 0.80 12/06/2016 10:57 AM    BUN/Creatinine ratio 14 12/06/2016 10:57 AM    GFR est AA >60 12/06/2016 10:57 AM    GFR est non-AA >60 12/06/2016 10:57 AM    Calcium 9.6 12/06/2016 10:57 AM    Bilirubin, total 0.4 05/01/2014 12:55 PM    Alk.  phosphatase 62 05/01/2014 12:55 PM Protein, total 7.7 05/01/2014 12:55 PM    Albumin 5.0 05/01/2014 12:55 PM    Globulin 3.4 04/20/2012 02:26 PM    A-G Ratio 1.9 05/01/2014 12:55 PM    ALT (SGPT) 30 05/01/2014 12:55 PM    AST (SGOT) 22 05/01/2014 12:55 PM          ASSESSMENT/PLAN    Diagnoses and all orders for this visit:    1. Essential hypertension    2. Primary insomnia  -     zolpidem CR (AMBIEN CR) 6.25 mg tablet; Take 1 Tablet by mouth nightly as needed for Sleep. Max Daily Amount: 6.25 mg. Other orders  -     topiramate (TOPAMAX) 25 mg tablet; Take 1 Tablet by mouth nightly. As above,     above all stable unless otherwise noted   treatment plan as listed below  Orders Placed This Encounter    topiramate (TOPAMAX) 25 mg tablet    zolpidem CR (AMBIEN CR) 6.25 mg tablet     Follow-up and Dispositions    · Return in about 6 months (around 6/8/2022) for well exam.       This has been fully explained to the patient, who indicates understanding. An After Visit Summary was printed and given to the patient.         Corinne Narayan MD

## 2021-12-08 NOTE — PROGRESS NOTES
Depression Screening:  3 most recent PHQ Screens 12/8/2021   Little interest or pleasure in doing things Not at all   Feeling down, depressed, irritable, or hopeless Not at all   Total Score PHQ 2 0       Learning Assessment:  Learning Assessment 4/27/2015   PRIMARY LEARNER Patient   PRIMARY LANGUAGE ENGLISH   LEARNER PREFERENCE PRIMARY DEMONSTRATION     -   ANSWERED BY patient   RELATIONSHIP SELF       Abuse Screening:  Abuse Screening Questionnaire 1/25/2021   Do you ever feel afraid of your partner? N   Are you in a relationship with someone who physically or mentally threatens you? N   Is it safe for you to go home? Y       Fall Risk  No flowsheet data found. ADL  No flowsheet data found. Travel Screening:    Travel Screening     Question   Response    In the last month, have you been in contact with someone who was confirmed or suspected to have Coronavirus / COVID-19? No / Unsure    Have you had a COVID-19 viral test in the last 14 days? No    Do you have any of the following new or worsening symptoms? None of these    Have you traveled internationally or domestically in the last month? No      Travel History   Travel since 11/08/21    No documented travel since 11/08/21         Health Maintenance reviewed and discussed and ordered per Provider. Health Maintenance Due   Topic Date Due    Hepatitis C Screening  Never done    COVID-19 Vaccine (1) Never done    Colorectal Cancer Screening Combo  Never done    Shingrix Vaccine Age 50> (1 of 2) Never done    Flu Vaccine (1) 09/01/2021    Lipid Screen  12/06/2021   . Russell Cruz presents today for   Chief Complaint   Patient presents with    Follow-up    Hypertension    Medication Refill       Is someone accompanying this pt? no    Is the patient using any DME equipment during OV? no    Coordination of Care:  1. Have you been to the ER, urgent care clinic since your last visit? Hospitalized since your last visit? no    2.  Have you seen or consulted any other health care providers outside of the [address] [place] [address] since your last visit? Include any pap smears or colon screening.  no

## 2022-03-04 LAB — COLONOSCOPY, EXTERNAL: NORMAL

## 2022-04-15 RX ORDER — METOPROLOL SUCCINATE 50 MG/1
TABLET, EXTENDED RELEASE ORAL
Qty: 90 TABLET | Refills: 3 | Status: SHIPPED | OUTPATIENT
Start: 2022-04-15

## 2022-06-08 ENCOUNTER — OFFICE VISIT (OUTPATIENT)
Dept: FAMILY MEDICINE CLINIC | Age: 52
End: 2022-06-08
Payer: COMMERCIAL

## 2022-06-08 VITALS
HEIGHT: 66 IN | BODY MASS INDEX: 33.33 KG/M2 | RESPIRATION RATE: 18 BRPM | OXYGEN SATURATION: 95 % | HEART RATE: 72 BPM | SYSTOLIC BLOOD PRESSURE: 126 MMHG | WEIGHT: 207.4 LBS | TEMPERATURE: 98.4 F | DIASTOLIC BLOOD PRESSURE: 76 MMHG

## 2022-06-08 DIAGNOSIS — M06.9 RHEUMATOID ARTHRITIS, INVOLVING UNSPECIFIED SITE, UNSPECIFIED WHETHER RHEUMATOID FACTOR PRESENT (HCC): ICD-10-CM

## 2022-06-08 DIAGNOSIS — Z00.00 WELL WOMAN EXAM (NO GYNECOLOGICAL EXAM): Primary | ICD-10-CM

## 2022-06-08 DIAGNOSIS — F51.01 PRIMARY INSOMNIA: ICD-10-CM

## 2022-06-08 PROCEDURE — 99396 PREV VISIT EST AGE 40-64: CPT | Performed by: FAMILY MEDICINE

## 2022-06-08 RX ORDER — ZOLPIDEM TARTRATE 6.25 MG/1
6.25 TABLET, FILM COATED, EXTENDED RELEASE ORAL
Qty: 30 TABLET | Refills: 5 | Status: SHIPPED | OUTPATIENT
Start: 2022-06-08

## 2022-06-08 NOTE — PATIENT INSTRUCTIONS

## 2022-06-13 NOTE — PROGRESS NOTES
Subjective:   46 y.o. female for Well Woman Check. Declines pap; last pap done 2019. Will plan to do repeat exam in 2024. She does her mammogram yearly with another resource; she does not need an order from here. Her last one was done June 2021. Her  has been recently diagnosed with prostate cancer. This has been a source of stress for her. She does request a refill on [place] which she uses for sleep. Under rheumatology care for RA. Patient Active Problem List    Diagnosis Date Noted    Mixed connective tissue disease ([facility] 09/12/2014    HTN (hypertension) 12/28/2012    Migraine     Insomnia     Screening for cervical cancer 04/06/2010     Current Outpatient Medications   Medication Sig Dispense Refill    zolpidem CR (AMBIEN CR) 6.25 mg tablet Take 1 Tablet by mouth nightly as needed for Sleep. Max Daily Amount: 6.25 mg. 30 Tablet 5    metoprolol succinate (TOPROL-XL) 50 mg XL tablet TAKE 1 TABLET DAILY 90 Tablet 3    topiramate (TOPAMAX) 25 mg tablet Take 1 Tablet by mouth nightly. 30 Tablet 5    promethazine (PHENERGAN) 25 mg tablet Take 1 Tablet by mouth every eight (8) hours as needed for Nausea. 90 Tablet 1    BLACK COHOSH PO Take  by mouth.  amitriptyline (ELAVIL) 25 mg tablet       gabapentin (NEURONTIN) 300 mg capsule Take 1 Cap by mouth three (3) times daily as needed for Pain (postherpetic neuralgia). (Patient taking differently: Take 300 mg by mouth two (2) times a day.) 90 Cap 0    fluticasone propionate (FLONASE) 50 mcg/actuation nasal spray 2 Sprays by Both Nostrils route daily. 3 Bottle 1    methotrexate (RHEUMATREX) 2.5 mg tablet       LORATADINE (CLARITIN PO) Take  by mouth.  meloxicam (MOBIC) 7.5 mg tablet       FOLIC ACID PO Take  by mouth.  multivitamin (ONE A DAY) tablet Take 1 Tab by mouth daily.  hydroxychloroquine (PLAQUENIL) 200 mg tablet Take 200 mg by mouth two (2) times a day.       butalbital-acetaminophen-caffeine (FIORICET) -40 mg per tablet Take 1 Tab by mouth. No Known Allergies  Past Medical History:   Diagnosis Date    Insomnia     Migraine     Screening for cervical cancer 4/6/2010     Past Surgical History:   Procedure Laterality Date    HX APPENDECTOMY  7/1/09    HX CHOLECYSTECTOMY  2001    HX GYN  2012    ceserean     [place] BREAST SURGERY PROCEDURE UNLISTED  2014    right breast bx     Family History   Problem Relation Age of Onset    Alcohol abuse Father     Diabetes Mother     Lung Disease Mother         COPD    Crohn's Disease Maternal Grandmother      Social History     Tobacco Use    Smoking status: Never Smoker    Smokeless tobacco: Never Used   Substance Use Topics    Alcohol use: Yes     Alcohol/week: 0.0 standard drinks     Comment: occasionally        Lab Results   Component Value Date/Time    Glucose 87 12/06/2016 10:57 AM    Glucose (POC) 77 04/18/2012 07:09 PM    LDL, calculated 116.8 (H) 12/06/2016 10:57 AM    Creatinine 0.80 12/06/2016 10:57 AM      Lab Results   Component Value Date/Time    Cholesterol, total 227 (H) 12/06/2016 10:57 AM    HDL Cholesterol 71 (H) 12/06/2016 10:57 AM    LDL, calculated 116.8 (H) 12/06/2016 10:57 AM    Triglyceride 196 (H) 12/06/2016 10:57 AM    CHOL/HDL Ratio 3.2 12/06/2016 10:57 AM        ROS: Feeling generally well. No TIA's or unusual headaches, no dysphagia. No prolonged cough. No dyspnea or chest pain on exertion. No abdominal pain, change in bowel habits, black or bloody stools. No urinary tract symptoms. No new or unusual musculoskeletal symptoms. Specific concerns today: none. Objective: The patient appears well, alert, oriented x 3, in no distress. Visit Vitals  /76   Pulse 72   Temp 98.4 °F (36.9 °C) (Temporal)   Resp 18   Ht 5' 6\" (1.676 m)   Wt 207 lb 6.4 oz (94.1 kg)   SpO2 95%   BMI 33.48 kg/m²     ENT normal.  Neck supple. No adenopathy or thyromegaly. MARIE.  Lungs are clear, good air entry, no wheezes, rhonchi or rales. S1 and S2 normal, no murmurs, regular rate and rhythm. Abdomen soft without tenderness, guarding, mass or organomegaly. Extremities show no edema, normal peripheral pulses. Neurological is normal, no focal findings. Breast and Pelvic exams are deferred. Assessment/Plan:   Well Woman      ICD-10-CM ICD-9-CM    1. Well woman exam (no gynecological exam)  Z00.00 V70.0    2. Rheumatoid arthritis, involving unspecified site, unspecified whether rheumatoid factor present ([facility]  M06.9 714.0    3. Primary insomnia  F51.01 307.42 zolpidem CR (AMBIEN CR) 6.25 mg tablet       As above  Pt stable   treatment plan as listed below  Orders Placed This Encounter    zolpidem CR (AMBIEN CR) 6.25 mg tablet     Declines blood work; states blood is routinely drawn at Rheumatology office  Follow-up and Dispositions    · Return in about 6 months (around 12/8/2022) for htn. This has been fully explained to the patient, who indicates understanding.

## 2022-07-15 ENCOUNTER — TRANSCRIBE ORDER (OUTPATIENT)
Dept: SCHEDULING | Age: 52
End: 2022-07-15

## 2022-07-15 DIAGNOSIS — Z12.31 VISIT FOR SCREENING MAMMOGRAM: Primary | ICD-10-CM

## 2022-07-16 ENCOUNTER — HOSPITAL ENCOUNTER (OUTPATIENT)
Dept: MAMMOGRAPHY | Age: 52
Discharge: HOME | End: 2022-07-16
Attending: FAMILY MEDICINE
Payer: COMMERCIAL

## 2022-07-16 DIAGNOSIS — Z12.31 VISIT FOR SCREENING MAMMOGRAM: ICD-10-CM

## 2022-07-16 PROCEDURE — 77063 BREAST TOMOSYNTHESIS BI: CPT

## 2022-08-12 RX ORDER — TOPIRAMATE 25 MG/1
TABLET ORAL
Qty: 30 TABLET | Refills: 5 | Status: SHIPPED | OUTPATIENT
Start: 2022-08-12

## 2022-10-05 ENCOUNTER — VIRTUAL VISIT (OUTPATIENT)
Dept: FAMILY MEDICINE CLINIC | Age: 52
End: 2022-10-05
Payer: COMMERCIAL

## 2022-10-05 DIAGNOSIS — U07.1 COVID-19: Primary | ICD-10-CM

## 2022-10-05 PROCEDURE — 99203 OFFICE O/P NEW LOW 30 MIN: CPT | Performed by: NURSE PRACTITIONER

## 2022-10-05 RX ORDER — BENZONATATE 200 MG/1
200 CAPSULE ORAL
Qty: 30 CAPSULE | Refills: 0 | Status: SHIPPED | OUTPATIENT
Start: 2022-10-05

## 2022-10-05 NOTE — PROGRESS NOTES
Humaira Rider (: 1970) is a 46 y.o. female, established patient, here for evaluation of the following chief complaint(s):   Positive For Covid-19 (Tested positive 10/3/22 with home test)         Humaira Rider, was evaluated through a synchronous (real-time) audio-video encounter. The patient (or guardian if applicable) is aware that this is a billable service, which includes applicable co-pays. This Virtual Visit was conducted with patient's (and/or legal guardian's) consent. The visit was conducted pursuant to the emergency declaration under the [facility], [address] [place] [address] authority and the [employer] and [employer] General Act. Patient identification was verified, and a caregiver was present when appropriate. The patient was located at: Home: [address] [place] [phone]  The provider was located at: Facility (Appt Department): [facility]  [address]       An [address] [facility] was used to authenticate this note.   -- Francisco Martin

## 2022-10-05 NOTE — PROGRESS NOTES
Jairon Truong is a 46 y.o. female who was seen by synchronous (real-time) audio-video technology on 10/5/2022 for Positive For Covid-19 (Tested positive 10/3/22 with home test)    Assessment & Plan:   Diagnoses and all orders for this visit:    1. COVID-19    Other orders  -     nirmatrelvir-ritonavir (Paxlovid, EUA,) 300 mg (150 mg x 2)-100 mg; Take 3 Tablets by mouth every twelve (12) hours. -     benzonatate (TESSALON) 200 mg capsule; Take 1 Capsule by mouth three (3) times daily as needed for Cough. alarm signs when to seek emergent care provided and reviewed   Continue symptomatic relief  CDC isolation guidelines reviewed  Follow-up and Dispositions    Return if symptoms worsen or fail to improve. 712  Subjective:   COVID-19 Assessment Note     Jairon Truong is currently Confirmed for COVID-19. Presenting symptoms: fever, chills, cough, nasal congestion, muscle pain, and malaise. States cough is worse at night. She denies  sore throat, shortness of breath, sputum production, chest pain, diarrhea, nausea and vomiting, abdominal pain, loss of smell, and loss of taste  Max temperature in last 24 hours: 102  Symptoms began on 10/2/2022. Exposure source: unknown, recently at a concert in [address] [place] [address] back Sunday. Prior to Admission medications    Medication Sig Start Date End Date Taking? Authorizing Provider   topiramate (TOPAMAX) 25 mg tablet TAKE ONE TABLET BY MOUTH ONCE NIGHTLY 8/12/22  Yes Naveed Obando MD   zolpidem CR (AMBIEN CR) 6.25 mg tablet Take 1 Tablet by mouth nightly as needed for Sleep. Max Daily Amount: 6.25 mg. 6/8/22  Yes Naveed Obando MD   metoprolol succinate (TOPROL-XL) 50 mg XL tablet TAKE 1 TABLET DAILY 4/15/22  Yes Naveed Obando MD   promethazine (PHENERGAN) 25 mg tablet Take 1 Tablet by mouth every eight (8) hours as needed for Nausea. 6/7/21  Yes Naveed Obando MD   BLACK COHOSH PO Take  by mouth.    Yes Provider, Historical   amitriptyline (ELAVIL) 25 mg tablet  7/14/20  Yes Provider, Historical   gabapentin (NEURONTIN) 300 mg capsule Take 1 Cap by mouth three (3) times daily as needed for Pain (postherpetic neuralgia). Patient taking differently: Take 300 mg by mouth two (2) times a day. 6/27/19  Yes Romina Bains MD   fluticasone propionate (FLONASE) 50 mcg/actuation nasal spray 2 Sprays by Both Nostrils route daily. 5/20/19  Yes Romina Bains MD   methotrexate (RHEUMATREX) 2.5 mg tablet  2/9/18  Yes Provider, Historical   LORATADINE (CLARITIN PO) Take  by mouth. Yes Provider, Historical   meloxicam (MOBIC) 7.5 mg tablet  4/10/17  Yes Provider, Historical   FOLIC ACID PO Take  by mouth. Yes Provider, Historical   multivitamin (ONE A DAY) tablet Take 1 Tab by mouth daily. Yes Provider, Historical   hydrOXYchloroQUINE (PLAQUENIL) 200 mg tablet Take 200 mg by mouth two (2) times a day. 4/6/10  Yes Provider, Historical   butalbital-acetaminophen-caffeine (FIORICET, ESGIC) -40 mg per tablet Take 1 Tab by mouth. Yes Provider, Historical     Patient Active Problem List   Diagnosis Code    Screening for cervical cancer Z12.4    Migraine G43.909    Insomnia G47.00    HTN (hypertension) I10    Mixed connective tissue disease ([facility] M35.1     Patient Active Problem List    Diagnosis Date Noted    [facility] 09/12/2014    HTN (hypertension) 12/28/2012    Migraine     Insomnia     Screening for cervical cancer 04/06/2010     Current Outpatient Medications   Medication Sig Dispense Refill    topiramate (TOPAMAX) 25 mg tablet TAKE ONE TABLET BY MOUTH ONCE NIGHTLY 30 Tablet 5    zolpidem CR (AMBIEN CR) 6.25 mg tablet Take 1 Tablet by mouth nightly as needed for Sleep. Max Daily Amount: 6.25 mg. 30 Tablet 5    metoprolol succinate (TOPROL-XL) 50 mg XL tablet TAKE 1 TABLET DAILY 90 Tablet 3    promethazine (PHENERGAN) 25 mg tablet Take 1 Tablet by mouth every eight (8) hours as needed for Nausea.  90 Tablet 1    BLACK COHOSH PO Take by mouth. amitriptyline (ELAVIL) 25 mg tablet       gabapentin (NEURONTIN) 300 mg capsule Take 1 Cap by mouth three (3) times daily as needed for Pain (postherpetic neuralgia). (Patient taking differently: Take 300 mg by mouth two (2) times a day.) 90 Cap 0    fluticasone propionate (FLONASE) 50 mcg/actuation nasal spray 2 Sprays by Both Nostrils route daily. 3 Bottle 1    methotrexate (RHEUMATREX) 2.5 mg tablet       LORATADINE (CLARITIN PO) Take  by mouth.      meloxicam (MOBIC) 7.5 mg tablet       FOLIC ACID PO Take  by mouth.      multivitamin (ONE A DAY) tablet Take 1 Tab by mouth daily. hydrOXYchloroQUINE (PLAQUENIL) 200 mg tablet Take 200 mg by mouth two (2) times a day. butalbital-acetaminophen-caffeine (FIORICET, ESGIC) -40 mg per tablet Take 1 Tab by mouth. No Known Allergies  Past Medical History:   Diagnosis Date    Insomnia     Migraine     Screening for cervical cancer 4/6/2010     Past Surgical History:   Procedure Laterality Date    HX APPENDECTOMY  7/1/09    HX CHOLECYSTECTOMY  2001    HX GYN  2012    ceserean     [place] BREAST SURGERY PROCEDURE UNLISTED  2014    right breast bx     Family History   Problem Relation Age of Onset    Alcohol abuse Father     Diabetes Mother     Lung Disease Mother         COPD    Crohn's Disease Maternal Grandmother      Social History     Tobacco Use    Smoking status: Never    Smokeless tobacco: Never   Substance Use Topics    Alcohol use: Yes     Alcohol/week: 0.0 standard drinks     Comment: occasionally       ROS    Objective:   No flowsheet data found.    General: alert, cooperative, no distress   Mental  status: normal mood, behavior, speech, dress, motor activity, and thought processes, able to follow commands   HENT: NCAT   Neck: no visualized mass   Resp: no respiratory distress   Neuro: no gross deficits   Skin: no discoloration or lesions of concern on visible areas   Psychiatric: normal affect, consistent with stated mood, no evidence of hallucinations     Additional exam findings: We discussed the expected course, resolution and complications of the diagnosis(es) in detail. Medication risks, benefits, costs, interactions, and alternatives were discussed as indicated. I advised her to contact the office if her condition worsens, changes or fails to improve as anticipated. She expressed understanding with the diagnosis(es) and plan. Stevie Domínguez, was evaluated through a synchronous (real-time) audio-video encounter. The patient (or guardian if applicable) is aware that this is a billable service, which includes applicable co-pays. This Virtual Visit was conducted with patient's (and/or legal guardian's) consent. The visit was conducted pursuant to the emergency declaration under the [address] [place] [address] authority and the [employer] General Act. Patient identification was verified, and a caregiver was present when appropriate. The patient was located at: Home: [address] [place] [phone]  The provider was located at:  Facility (Appt Department): [address] [place] [address] ROSARIO Bach

## 2022-12-05 ENCOUNTER — OFFICE VISIT (OUTPATIENT)
Dept: FAMILY MEDICINE CLINIC | Age: 52
End: 2022-12-05
Payer: COMMERCIAL

## 2022-12-05 VITALS
OXYGEN SATURATION: 96 % | SYSTOLIC BLOOD PRESSURE: 109 MMHG | DIASTOLIC BLOOD PRESSURE: 73 MMHG | WEIGHT: 214.4 LBS | TEMPERATURE: 97.2 F | BODY MASS INDEX: 35.72 KG/M2 | HEIGHT: 65 IN | HEART RATE: 76 BPM | RESPIRATION RATE: 16 BRPM

## 2022-12-05 DIAGNOSIS — Z23 NEEDS FLU SHOT: ICD-10-CM

## 2022-12-05 DIAGNOSIS — F51.01 PRIMARY INSOMNIA: ICD-10-CM

## 2022-12-05 DIAGNOSIS — I10 ESSENTIAL HYPERTENSION: Primary | ICD-10-CM

## 2022-12-05 DIAGNOSIS — E66.01 SEVERE OBESITY (BMI 35.0-39.9) WITH COMORBIDITY (HCC): ICD-10-CM

## 2022-12-05 PROCEDURE — 90686 IIV4 VACC NO PRSV 0.5 ML IM: CPT | Performed by: FAMILY MEDICINE

## 2022-12-05 PROCEDURE — 3074F SYST BP LT 130 MM HG: CPT | Performed by: FAMILY MEDICINE

## 2022-12-05 PROCEDURE — 90471 IMMUNIZATION ADMIN: CPT | Performed by: FAMILY MEDICINE

## 2022-12-05 PROCEDURE — 3078F DIAST BP <80 MM HG: CPT | Performed by: FAMILY MEDICINE

## 2022-12-05 PROCEDURE — 99214 OFFICE O/P EST MOD 30 MIN: CPT | Performed by: FAMILY MEDICINE

## 2022-12-05 RX ORDER — ZOLPIDEM TARTRATE 6.25 MG/1
6.25 TABLET, FILM COATED, EXTENDED RELEASE ORAL
Qty: 30 TABLET | Refills: 5 | Status: SHIPPED | OUTPATIENT
Start: 2022-12-05

## 2022-12-05 NOTE — PROGRESS NOTES
HPI:  Karoline Ficnh is a 46 y.o. female who presents today with   Chief Complaint   Patient presents with    Hypertension     6 mf/u      Pt has been well; Had COVID about 65 days ago;  lungs still feel \" sticky\" ; he has no significant cough or fever. Her blood pressure is stable today  She is on medications as listed below    She does have chronic insomnia. She is on Ambien CR. She needs a refill today. Patient also agrees to a flu shot today. Patient declines blood tests she does get blood test with her rheumatologist.  She also gets blood work through her 's job. BMI is noted. 3 most recent PHQ Screens 12/5/2022   Little interest or pleasure in doing things Not at all   Feeling down, depressed, irritable, or hopeless Not at all   Total Score PHQ 2 0               PMH,  Meds, Allergies, Family History, Social history reviewed      Current Outpatient Medications   Medication Sig Dispense Refill    topiramate (TOPAMAX) 25 mg tablet TAKE ONE TABLET BY MOUTH ONCE NIGHTLY 30 Tablet 5    zolpidem CR (AMBIEN CR) 6.25 mg tablet Take 1 Tablet by mouth nightly as needed for Sleep. Max Daily Amount: 6.25 mg. 30 Tablet 5    metoprolol succinate (TOPROL-XL) 50 mg XL tablet TAKE 1 TABLET DAILY 90 Tablet 3    promethazine (PHENERGAN) 25 mg tablet Take 1 Tablet by mouth every eight (8) hours as needed for Nausea. 90 Tablet 1    amitriptyline (ELAVIL) 25 mg tablet       gabapentin (NEURONTIN) 300 mg capsule Take 1 Cap by mouth three (3) times daily as needed for Pain (postherpetic neuralgia). (Patient taking differently: Take 300 mg by mouth two (2) times a day.) 90 Cap 0    fluticasone propionate (FLONASE) 50 mcg/actuation nasal spray 2 Sprays by Both Nostrils route daily. 3 Bottle 1    methotrexate (RHEUMATREX) 2.5 mg tablet       LORATADINE (CLARITIN PO) Take  by mouth.      meloxicam (MOBIC) 7.5 mg tablet       FOLIC ACID PO Take  by mouth.      multivitamin (ONE A DAY) tablet Take 1 Tab by mouth daily. hydrOXYchloroQUINE (PLAQUENIL) 200 mg tablet Take 200 mg by mouth two (2) times a day. butalbital-acetaminophen-caffeine (FIORICET, ESGIC) -40 mg per tablet Take 1 Tab by mouth.      benzonatate (TESSALON) 200 mg capsule Take 1 Capsule by mouth three (3) times daily as needed for Cough. (Patient not taking: Reported on 12/5/2022) 30 Capsule 0    BLACK COHOSH PO Take  by mouth. (Patient not taking: Reported on 12/5/2022)          No Known Allergies               ROS as per HPI      Visit Vitals  /73 (BP 1 Location: Left upper arm, BP Patient Position: Sitting, BP Cuff Size: Large adult)   Pulse 76   Temp 97.2 °F (36.2 °C) (Temporal)   Resp 16   Ht 5' 5\" (1.651 m)   Wt 214 lb 6.4 oz (97.3 kg)   SpO2 96%   BMI 35.68 kg/m²     Physical Exam  General appearance: alert, cooperative, no distress, appears stated age  Neck: supple, symmetrical, trachea midline, no adenopathy, thyroid: not enlarged, symmetric, no tenderness/mass/nodules, no carotid bruit and no JVD  Lungs: clear to auscultation bilaterally  Heart: regular rate and rhythm, S1, S2 normal, no murmur, click, rub or gallop  Extremities: extremities normal, atraumatic, no cyanosis or edema      Assessment/Plan:    Diagnoses and all orders for this visit:    1. Essential hypertension    2. Needs flu shot  -     INFLUENZA, FLUARIX, FLULAVAL, FLUZONE (AGE 6 MO+), AFLURIA(AGE 3Y+) IM, PF, 0.5 ML    3. Severe obesity (BMI 35.0-39. 9) with comorbidity ([address]    4. Primary insomnia  -     zolpidem CR (AMBIEN CR) 6.25 mg tablet; Take 1 Tablet by mouth nightly as needed for Sleep. Max Daily Amount: 6.25 mg. As above  Patient stable  Refilled Ambien  Flu shot today  An After Visit Summary was printed and given to the patient. This has been fully explained to the patient, who indicates understanding. Follow-up and Dispositions    Return in about 6 months (around 6/5/2023) for htn.             Ashok Leslie MD

## 2022-12-05 NOTE — PROGRESS NOTES
1. \"Have you been to the ER, urgent care clinic since your last visit? Hospitalized since your last visit? \" No    2. \"Have you seen or consulted any other health care providers outside of the [address] [place] [address] since your last visit? \"  Yes, Dr. Refugio Hernandes for rhuematology       3. For patients aged 39-70: Has the patient had a colonoscopy / FIT/ Cologuard? Yes - Care Gap present. Most recent result on file      If the patient is female:    4. For patients aged 41-77: Has the patient had a mammogram within the past 2 years? Yes - Care Gap present. Most recent result on file      5. For patients aged 21-65: Has the patient had a pap smear? Yes - Care Gap present. Most recent result on file      Karthikeyan Found denies any symptoms , reactions or allergies that would exclude them from being immunized today. Risks and adverse reactions were discussed and the most current VIS was given to them along with consent signed. All questions were addressed. Patient received influenza vaccine 0.5mL in right deltoid. Tolerated well. No signs or symptoms of distress noted. Patient left office ambulatory.

## 2022-12-05 NOTE — PATIENT INSTRUCTIONS
Vaccine Information Statement    Influenza (Flu) Vaccine (Inactivated or Recombinant): What You Need to Know    Many vaccine information statements are available in Bulgarian and other languages. See www.immunize.org/vis. Hojas de información sobre vacunas están disponibles en español y en muchos otros idiomas. Visite www.immunize.org/vis. 1. Why get vaccinated? Influenza vaccine can prevent influenza (flu). Flu is a contagious disease that spreads around the [facility] every year, usually between October and May. Anyone can get the flu, but it is more dangerous for some people. Infants and young children, people 72 years and older, pregnant people, and people with certain health conditions or a weakened immune system are at greatest risk of flu complications. Pneumonia, bronchitis, sinus infections, and ear infections are examples of flu-related complications. If you have a medical condition, such as heart disease, cancer, or diabetes, flu can make it worse. Flu can cause fever and chills, sore throat, muscle aches, fatigue, cough, headache, and runny or stuffy nose. Some people may have vomiting and diarrhea, though this is more common in children than adults. In an average year, thousands of people in the [facility] die from flu, and many more are hospitalized. Flu vaccine prevents millions of illnesses and flu-related visits to the doctor each year. 2. Influenza vaccines     CDC recommends everyone 6 months and older get vaccinated every flu season. Children 6 months through 6years of age may need 2 doses during a single flu season. Everyone else needs only 1 dose each flu season. It takes about 2 weeks for protection to develop after vaccination. There are many flu viruses, and they are always changing. Each year a new flu vaccine is made to protect against the influenza viruses believed to be likely to cause disease in the upcoming flu season.  Even when the vaccine doesnt exactly match these viruses, it may still provide some protection. Influenza vaccine does not cause flu. Influenza vaccine may be given at the same time as other vaccines. 3. Talk with your health care provider    Tell your vaccination provider if the person getting the vaccine:  Has had an allergic reaction after a previous dose of influenza vaccine, or has any severe, life-threatening allergies   Has ever had Guillain-Barré Syndrome (also called GBS)    In some cases, your health care provider may decide to postpone influenza vaccination until a future visit. Influenza vaccine can be administered at any time during pregnancy. People who are or will be pregnant during influenza season should receive inactivated influenza vaccine. People with minor illnesses, such as a cold, may be vaccinated. People who are moderately or severely ill should usually wait until they recover before getting influenza vaccine. Your health care provider can give you more information. 4. Risks of a vaccine reaction    Soreness, redness, and swelling where the shot is given, fever, muscle aches, and headache can happen after influenza vaccination. There may be a very small increased risk of Guillain-Barré Syndrome (GBS) after inactivated influenza vaccine (the flu shot). Verenice Noe children who get the flu shot along with pneumococcal vaccine (PCV13) and/or DTaP vaccine at the same time might be slightly more likely to have a seizure caused by fever. Tell your health care provider if a child who is getting flu vaccine has ever had a seizure. People sometimes faint after medical procedures, including vaccination. Tell your provider if you feel dizzy or have vision changes or ringing in the ears. As with any medicine, there is a very remote chance of a vaccine causing a severe allergic reaction, other serious injury, or death. 5. What if there is a serious problem?     An allergic reaction could occur after the vaccinated person leaves the clinic. If you see signs of a severe allergic reaction (hives, swelling of the face and throat, difficulty breathing, a fast heartbeat, dizziness, or weakness), call 9-1-1 and get the person to the nearest hospital.    For other signs that concern you, call your health care provider. Adverse reactions should be reported to the Vaccine Adverse Event Reporting System (VAERS). Your health care provider will usually file this report, or you can do it yourself. Visit the VAERS website at www.vaers. [facility].gov or call 7-969.709.4928. VAERS is only for reporting reactions, and VAERS staff members do not give medical advice. 6. The National Vaccine Injury Compensation Program    The [facility] Vaccine Injury Compensation Program (VICP) is a federal program that was created to compensate people who may have been injured by certain vaccines. Claims regarding alleged injury or death due to vaccination have a time limit for filing, which may be as short as two years. Visit the VICP website at www.[facility].gov/vaccinecompensation or call 9-440.474.1988 to learn about the program and about filing a claim. 7. How can I learn more? Ask your health care provider. Call your local or state health department. Visit the website of the Food and Drug Administration (FDA) for vaccine package inserts and additional information at www.fda.gov/vaccines-blood-biologics/vaccines. Contact the Centers for Disease Control and Prevention (CDC): Call 5-558.950.3449 (1-800-CDC-INFO) or  Visit CDCs influenza website at www.cdc.gov/flu. Vaccine Information Statement   Inactivated Influenza Vaccine   8/6/2021  42 VIJAYA FuentesTurner Jodie [id number]-[id number]     Department of Health and Human Services  Centers for Disease Control and Prevention    Office Use Only

## 2023-02-14 RX ORDER — TOPIRAMATE 25 MG/1
TABLET ORAL
Qty: 30 TABLET | OUTPATIENT
Start: 2023-02-14

## 2023-04-06 NOTE — TELEPHONE ENCOUNTER
Last Visit: 12- OV   Next Appointment: 06-  Previous Refill Encounter: 04- #90 tabs with 3 refills        Requested Prescriptions     Pending Prescriptions Disp Refills    metoprolol succinate (TOPROL XL) 50 MG extended release tablet [Pharmacy Med Name: METOPROLOL SUCCINATE ER TABS 50MG] 90 tablet 3     Sig: TAKE 1 TABLET DAILY

## 2023-04-08 RX ORDER — METOPROLOL SUCCINATE 50 MG/1
TABLET, EXTENDED RELEASE ORAL
Qty: 90 TABLET | Refills: 3 | Status: SHIPPED | OUTPATIENT
Start: 2023-04-08

## 2023-06-05 ENCOUNTER — OFFICE VISIT (OUTPATIENT)
Age: 53
End: 2023-06-05
Payer: COMMERCIAL

## 2023-06-05 VITALS
HEART RATE: 65 BPM | WEIGHT: 211.8 LBS | OXYGEN SATURATION: 97 % | SYSTOLIC BLOOD PRESSURE: 117 MMHG | DIASTOLIC BLOOD PRESSURE: 78 MMHG | HEIGHT: 65 IN | TEMPERATURE: 97 F | RESPIRATION RATE: 18 BRPM | BODY MASS INDEX: 35.29 KG/M2

## 2023-06-05 DIAGNOSIS — F51.01 PRIMARY INSOMNIA: ICD-10-CM

## 2023-06-05 DIAGNOSIS — E66.01 SEVERE OBESITY (BMI 35.0-39.9) WITH COMORBIDITY (HCC): ICD-10-CM

## 2023-06-05 DIAGNOSIS — I10 ESSENTIAL (PRIMARY) HYPERTENSION: Primary | ICD-10-CM

## 2023-06-05 PROCEDURE — 3074F SYST BP LT 130 MM HG: CPT | Performed by: FAMILY MEDICINE

## 2023-06-05 PROCEDURE — 99214 OFFICE O/P EST MOD 30 MIN: CPT | Performed by: FAMILY MEDICINE

## 2023-06-05 PROCEDURE — 3078F DIAST BP <80 MM HG: CPT | Performed by: FAMILY MEDICINE

## 2023-06-05 RX ORDER — ZOLPIDEM TARTRATE 6.25 MG/1
6.25 TABLET, FILM COATED, EXTENDED RELEASE ORAL NIGHTLY PRN
Qty: 30 TABLET | Refills: 5 | Status: SHIPPED | OUTPATIENT
Start: 2023-06-05 | End: 2023-12-02

## 2023-06-05 RX ORDER — IBUPROFEN 800 MG/1
800 TABLET ORAL EVERY 8 HOURS PRN
COMMUNITY
Start: 2019-06-20

## 2023-06-05 RX ORDER — LORATADINE 10 MG/1
CAPSULE, LIQUID FILLED ORAL
COMMUNITY

## 2023-06-05 RX ORDER — FOLIC ACID 1 MG/1
1000 TABLET ORAL DAILY
COMMUNITY
Start: 2023-03-20

## 2023-06-05 SDOH — ECONOMIC STABILITY: FOOD INSECURITY: WITHIN THE PAST 12 MONTHS, YOU WORRIED THAT YOUR FOOD WOULD RUN OUT BEFORE YOU GOT MONEY TO BUY MORE.: NEVER TRUE

## 2023-06-05 SDOH — ECONOMIC STABILITY: HOUSING INSECURITY
IN THE LAST 12 MONTHS, WAS THERE A TIME WHEN YOU DID NOT HAVE A STEADY PLACE TO SLEEP OR SLEPT IN A SHELTER (INCLUDING NOW)?: NO

## 2023-06-05 SDOH — ECONOMIC STABILITY: INCOME INSECURITY: HOW HARD IS IT FOR YOU TO PAY FOR THE VERY BASICS LIKE FOOD, HOUSING, MEDICAL CARE, AND HEATING?: NOT VERY HARD

## 2023-06-05 SDOH — ECONOMIC STABILITY: FOOD INSECURITY: WITHIN THE PAST 12 MONTHS, THE FOOD YOU BOUGHT JUST DIDN'T LAST AND YOU DIDN'T HAVE MONEY TO GET MORE.: NEVER TRUE

## 2023-06-05 NOTE — PROGRESS NOTES
HPI:  Lyric Bass is a 46 y.o. female who presents today with   Chief Complaint   Patient presents with    Hypertension     6 month follow-up    Medication Refill     Ambien follow-up        Patient's BP is  stable; Pt is on medications as listed below and  tolerates medications well  Patient also has insomnia; .  Pt is on ambien for this. Pt  tolerates ambien and is compliant with the medication. Pt gets blood work with her 's job for his incentives ; declines blood work to be done here. Wt Readings from Last 3 Encounters:   06/05/23 211 lb 12.8 oz (96.1 kg)   12/05/22 214 lb 6.4 oz (97.3 kg)   06/08/22 207 lb 6.4 oz (94.1 kg)         No flowsheet data found. PMH,  Meds, Allergies, Family History, Social history reviewed      Current Outpatient Medications   Medication Sig Dispense Refill    zolpidem (AMBIEN CR) 6.25 MG extended release tablet Take 1 tablet by mouth nightly as needed for Sleep for up to 180 days. Max Daily Amount: 6.25 mg 30 tablet 5    loratadine (CLARITIN) 10 MG capsule Take by mouth      Multiple Vitamin (MULTIVITAMIN ADULT PO) Take 1 tablet by mouth daily      ibuprofen (ADVIL;MOTRIN) 800 MG tablet Take 1 tablet by mouth every 8 hours as needed      folic acid (FOLVITE) 1 MG tablet Take 1 tablet by mouth daily      metoprolol succinate (TOPROL XL) 50 MG extended release tablet TAKE 1 TABLET DAILY 90 tablet 3    amitriptyline (ELAVIL) 25 MG tablet ceived the following from [facility]: Outside name: amitriptyline (ELAVIL) 25 mg tablet      butalbital-acetaminophen-caffeine (FIORICET, ESGIC) -40 MG per tablet Take 1 tablet by mouth      fluticasone (FLONASE) 50 MCG/ACT nasal spray 2 sprays by Nasal route daily      gabapentin (NEURONTIN) 300 MG capsule Take 1 capsule by mouth 3 times daily as needed.       hydroxychloroquine (PLAQUENIL) 200 MG tablet Take 1 tablet by mouth 2 times daily      meloxicam (MOBIC) 7.5 MG tablet ceived the following from

## 2023-06-05 NOTE — PATIENT INSTRUCTIONS
dip.  Sprinkle sunflower seeds or chopped almonds over salads. Or try adding chopped walnuts or almonds to cooked vegetables. Try some vegetarian meals using beans and peas. Add garbanzo or kidney beans to salads. Make burritos and tacos with mashed chase beans or black beans. Where can you learn more? Go to http://www.woods.com/ and enter H967 to learn more about \"DASH Diet: Care Instructions. \"  Current as of: September 7, 2022               Content Version: 13.5  © 2395-6098 [employer]wise, Incorporated. Care instructions adapted under license by [facility] ([facility]). If you have questions about a medical condition or this instruction, always ask your healthcare professional. [address] any warranty or liability for your use of this information.

## 2023-06-05 NOTE — PROGRESS NOTES
1. \"Have you been to the ER, urgent care clinic since your last visit? Hospitalized since your last visit? \" No    2. \"Have you seen or consulted any other health care providers outside of the [address] [place] [address] since your last visit? \" No     3. For patients aged 39-70: Has the patient had a colonoscopy / FIT/ Cologuard? Yes - Care Gap present. Most recent result on file      If the patient is female:    4. For patients aged 41-77: Has the patient had a mammogram within the past 2 years? No      5. For patients aged 21-65: Has the patient had a pap smear? Yes - Care Gap present.  Most recent result on file

## 2023-08-28 ENCOUNTER — TRANSCRIBE ORDERS (OUTPATIENT)
Facility: HOSPITAL | Age: 53
End: 2023-08-28

## 2023-08-28 DIAGNOSIS — Z12.31 ENCOUNTER FOR SCREENING MAMMOGRAM FOR BREAST CANCER: Primary | ICD-10-CM

## 2023-10-05 ENCOUNTER — HOSPITAL ENCOUNTER (OUTPATIENT)
Facility: HOSPITAL | Age: 53
Discharge: HOME | End: 2023-10-05
Attending: FAMILY MEDICINE
Payer: COMMERCIAL

## 2023-10-05 VITALS — HEIGHT: 65 IN | BODY MASS INDEX: 35.16 KG/M2 | WEIGHT: 211 LBS

## 2023-10-05 DIAGNOSIS — Z12.31 ENCOUNTER FOR SCREENING MAMMOGRAM FOR BREAST CANCER: ICD-10-CM

## 2023-10-05 PROCEDURE — 77063 BREAST TOMOSYNTHESIS BI: CPT

## 2023-10-05 NOTE — TELEPHONE ENCOUNTER
Last Visit: 06- OV   Next Appointment: 12-  Previous Refill Encounter: 08-     Requested Prescriptions     Pending Prescriptions Disp Refills    topiramate (TOPAMAX) 25 MG tablet [Pharmacy Med Name: TOPIRAMATE TABS 25MG] 90 tablet 3     Sig: TAKE 1 TABLET NIGHTLY

## 2023-10-13 RX ORDER — TOPIRAMATE 25 MG/1
25 TABLET ORAL
Qty: 90 TABLET | Refills: 3 | Status: SHIPPED | OUTPATIENT
Start: 2023-10-13

## 2023-12-04 ENCOUNTER — OFFICE VISIT (OUTPATIENT)
Age: 53
End: 2023-12-04
Payer: COMMERCIAL

## 2023-12-04 VITALS
HEIGHT: 65 IN | WEIGHT: 218.8 LBS | DIASTOLIC BLOOD PRESSURE: 72 MMHG | BODY MASS INDEX: 36.46 KG/M2 | HEART RATE: 72 BPM | TEMPERATURE: 96.9 F | RESPIRATION RATE: 18 BRPM | OXYGEN SATURATION: 95 % | SYSTOLIC BLOOD PRESSURE: 103 MMHG

## 2023-12-04 DIAGNOSIS — E66.01 CLASS 2 SEVERE OBESITY DUE TO EXCESS CALORIES WITH SERIOUS COMORBIDITY AND BODY MASS INDEX (BMI) OF 36.0 TO 36.9 IN ADULT (HCC): ICD-10-CM

## 2023-12-04 DIAGNOSIS — Z00.00 WELL WOMAN EXAM (NO GYNECOLOGICAL EXAM): Primary | ICD-10-CM

## 2023-12-04 DIAGNOSIS — F51.01 PRIMARY INSOMNIA: ICD-10-CM

## 2023-12-04 DIAGNOSIS — I10 ESSENTIAL (PRIMARY) HYPERTENSION: ICD-10-CM

## 2023-12-04 DIAGNOSIS — Z23 ENCOUNTER FOR IMMUNIZATION: ICD-10-CM

## 2023-12-04 PROCEDURE — 90471 IMMUNIZATION ADMIN: CPT | Performed by: FAMILY MEDICINE

## 2023-12-04 PROCEDURE — 3078F DIAST BP <80 MM HG: CPT | Performed by: FAMILY MEDICINE

## 2023-12-04 PROCEDURE — 3074F SYST BP LT 130 MM HG: CPT | Performed by: FAMILY MEDICINE

## 2023-12-04 PROCEDURE — 99396 PREV VISIT EST AGE 40-64: CPT | Performed by: FAMILY MEDICINE

## 2023-12-04 PROCEDURE — 90674 CCIIV4 VAC NO PRSV 0.5 ML IM: CPT | Performed by: FAMILY MEDICINE

## 2023-12-04 PROCEDURE — 99213 OFFICE O/P EST LOW 20 MIN: CPT | Performed by: FAMILY MEDICINE

## 2023-12-04 RX ORDER — PROMETHAZINE HYDROCHLORIDE 25 MG/1
25 TABLET ORAL EVERY 8 HOURS PRN
Qty: 90 TABLET | Refills: 0 | Status: SHIPPED | OUTPATIENT
Start: 2023-12-04

## 2023-12-04 RX ORDER — PHENTERMINE HYDROCHLORIDE 37.5 MG/1
37.5 CAPSULE ORAL EVERY MORNING
Qty: 30 CAPSULE | Refills: 1 | Status: SHIPPED | OUTPATIENT
Start: 2023-12-04 | End: 2024-02-02

## 2023-12-04 RX ORDER — ZOLPIDEM TARTRATE 6.25 MG/1
6.25 TABLET, FILM COATED, EXTENDED RELEASE ORAL NIGHTLY PRN
Qty: 30 TABLET | Refills: 0 | Status: SHIPPED | OUTPATIENT
Start: 2023-12-04 | End: 2023-12-04

## 2023-12-04 RX ORDER — ZOLPIDEM TARTRATE 6.25 MG/1
6.25 TABLET, FILM COATED, EXTENDED RELEASE ORAL NIGHTLY PRN
Qty: 30 TABLET | Refills: 2 | Status: SHIPPED | OUTPATIENT
Start: 2023-12-04 | End: 2024-03-03

## 2023-12-04 ASSESSMENT — PATIENT HEALTH QUESTIONNAIRE - PHQ9
1. LITTLE INTEREST OR PLEASURE IN DOING THINGS: 0
SUM OF ALL RESPONSES TO PHQ QUESTIONS 1-9: 0
SUM OF ALL RESPONSES TO PHQ9 QUESTIONS 1 & 2: 0
SUM OF ALL RESPONSES TO PHQ QUESTIONS 1-9: 0
SUM OF ALL RESPONSES TO PHQ QUESTIONS 1-9: 0
2. FEELING DOWN, DEPRESSED OR HOPELESS: 0
SUM OF ALL RESPONSES TO PHQ QUESTIONS 1-9: 0

## 2023-12-04 ASSESSMENT — ANXIETY QUESTIONNAIRES
2. NOT BEING ABLE TO STOP OR CONTROL WORRYING: 0
4. TROUBLE RELAXING: 0
GAD7 TOTAL SCORE: 0
6. BECOMING EASILY ANNOYED OR IRRITABLE: 0
7. FEELING AFRAID AS IF SOMETHING AWFUL MIGHT HAPPEN: 0
1. FEELING NERVOUS, ANXIOUS, OR ON EDGE: 0
5. BEING SO RESTLESS THAT IT IS HARD TO SIT STILL: 0
3. WORRYING TOO MUCH ABOUT DIFFERENT THINGS: 0
IF YOU CHECKED OFF ANY PROBLEMS ON THIS QUESTIONNAIRE, HOW DIFFICULT HAVE THESE PROBLEMS MADE IT FOR YOU TO DO YOUR WORK, TAKE CARE OF THINGS AT HOME, OR GET ALONG WITH OTHER PEOPLE: NOT DIFFICULT AT ALL

## 2023-12-04 NOTE — PROGRESS NOTES
The patient consents to receiving injection. Patient received Flu injection which was administered at 1023 in the Right Deltoid successfully. Patient Declined to wait the 15 minutes to monitor for medication toleration without adverse reactions signs or symptoms around the injection site, thank you.      : Seqirus    Medication: Flucelvax Quadrivalent    Lot#: [id number]    NDC#: [id number]     Dose: 0.5 ML   Exp:  06/30/2024  Site:  Right Deltoid  Time:  3180

## 2023-12-04 NOTE — PROGRESS NOTES
Well Adult Note  Name: Aria Barone Date: 2023   MRN: [id number] Sex: Female   Age: 46 y.o. Ethnicity: Non- / Non    : 1970 Race: White (non-)      Josh Fernandes is here for well adult exam.  History:  Patient has been well    She still continues to follow-up with rheumatology    She typically declines blood work. States that she gets her blood work through  Dr. Dionne Garcia /rheumatology. She has had her blood work done and actually has her leg rest values with her today. Her triglyceride level is elevated. She thinks that this is the first time it has been as elevated. Her blood sugars were normal.      She expresses concerns about inability to lose weight. She has been exercising, has changed her diet but she always feels hungry. She is interested in a medication to assist with her weight loss efforts. She requests refills of some of her medications to different pharmacies. Review of Systems as per HPI, states she gets nasal stuffiness in the right nare; she does not use her Flonase regularly. States she plans to utilize this more and see if her symptoms improve. If her symptoms do not improve can consider ENT referral.      No Known Allergies      Prior to Visit Medications    Medication Sig Taking? Authorizing Provider   phentermine 37.5 MG capsule Take 1 capsule by mouth every morning for 60 days. Max Daily Amount: 37.5 mg Yes Judy Jaffe MD   zolpidem (AMBIEN CR) 6.25 MG extended release tablet Take 1 tablet by mouth nightly as needed for Sleep for up to 90 days.  Max Daily Amount: 6.25 mg Yes Judy Jaffe MD   promethazine (PHENERGAN) 25 MG tablet Take 1 tablet by mouth every 8 hours as needed for Nausea Yes Judy Jaffe MD   topiramate (TOPAMAX) 25 MG tablet TAKE 1 TABLET NIGHTLY Yes Judy Jaffe MD   loratadine (CLARITIN) 10 MG capsule Take by mouth Yes Provider, MD Adelfo   Multiple Vitamin (MULTIVITAMIN ADULT PO) Take 1 tablet by

## 2023-12-04 NOTE — PROGRESS NOTES
1. \"Have you been to the ER, urgent care clinic since your last visit? Hospitalized since your last visit? \" No    2. \"Have you seen or consulted any other health care providers outside of the [address] [place] [address] since your last visit? \" Yes, Rheumatology- Dr. Kendy Jean      3. For patients aged 43-73: Has the patient had a colonoscopy / FIT/ Cologuard? Yes - Care Gap present. Most recent result on file      If the patient is female:    4. For patients aged 43-66: Has the patient had a mammogram within the past 2 years? Yes - Care Gap present. Most recent result on file      5. For patients aged 21-65: Has the patient had a pap smear? Yes - Care Gap present.  Most recent result on file

## 2024-02-05 ENCOUNTER — OFFICE VISIT (OUTPATIENT)
Age: 54
End: 2024-02-05
Payer: COMMERCIAL

## 2024-02-05 VITALS
RESPIRATION RATE: 18 BRPM | WEIGHT: 203.2 LBS | OXYGEN SATURATION: 98 % | TEMPERATURE: 98 F | HEART RATE: 59 BPM | SYSTOLIC BLOOD PRESSURE: 113 MMHG | DIASTOLIC BLOOD PRESSURE: 75 MMHG | HEIGHT: 65 IN | BODY MASS INDEX: 33.85 KG/M2

## 2024-02-05 DIAGNOSIS — E66.01 CLASS 2 SEVERE OBESITY DUE TO EXCESS CALORIES WITH SERIOUS COMORBIDITY AND BODY MASS INDEX (BMI) OF 36.0 TO 36.9 IN ADULT (HCC): Primary | ICD-10-CM

## 2024-02-05 DIAGNOSIS — F51.01 PRIMARY INSOMNIA: ICD-10-CM

## 2024-02-05 PROCEDURE — 3074F SYST BP LT 130 MM HG: CPT | Performed by: FAMILY MEDICINE

## 2024-02-05 PROCEDURE — 3078F DIAST BP <80 MM HG: CPT | Performed by: FAMILY MEDICINE

## 2024-02-05 PROCEDURE — 99214 OFFICE O/P EST MOD 30 MIN: CPT | Performed by: FAMILY MEDICINE

## 2024-02-05 RX ORDER — PHENTERMINE HYDROCHLORIDE 37.5 MG/1
37.5 CAPSULE ORAL EVERY MORNING
COMMUNITY
End: 2024-02-05 | Stop reason: SDUPTHER

## 2024-02-05 RX ORDER — PHENTERMINE HYDROCHLORIDE 37.5 MG/1
37.5 CAPSULE ORAL EVERY MORNING
Qty: 30 CAPSULE | Refills: 2 | Status: SHIPPED | OUTPATIENT
Start: 2024-02-05 | End: 2024-05-05

## 2024-02-05 RX ORDER — ZOLPIDEM TARTRATE 6.25 MG/1
6.25 TABLET, FILM COATED, EXTENDED RELEASE ORAL NIGHTLY PRN
Qty: 30 TABLET | Refills: 5 | Status: SHIPPED | OUTPATIENT
Start: 2024-02-05 | End: 2024-08-03

## 2024-02-05 NOTE — PROGRESS NOTES
1. \"Have you been to the ER, urgent care clinic since your last visit?  Hospitalized since your last visit?\" No    2. \"Have you seen or consulted any other health care providers outside of the [facility] since your last visit?\" Yes, Rheumatology- Dr. Apple Rogers       3. For patients aged 45-75: Has the patient had a colonoscopy / FIT/ Cologuard? Yes - Care Gap present. Most recent result on file      If the patient is female:    4. For patients aged 40-74: Has the patient had a mammogram within the past 2 years? Yes - Care Gap present. Most recent result on file      5. For patients aged 21-65: Has the patient had a pap smear? Yes - Care Gap present. Most recent result on file

## 2024-04-05 RX ORDER — METOPROLOL SUCCINATE 50 MG/1
TABLET, EXTENDED RELEASE ORAL
Qty: 90 TABLET | Refills: 3 | Status: SHIPPED | OUTPATIENT
Start: 2024-04-05

## 2024-08-07 NOTE — TELEPHONE ENCOUNTER
Authorization for quantity limit approval has been faxed to the pharmacy for review. Normocephalic and atraumatic.   Eyes:      General: No scleral icterus.        Right eye: No discharge.         Left eye: No discharge.      Conjunctiva/sclera: Conjunctivae normal.      Pupils: Pupils are equal, round, and reactive to light.   Cardiovascular:      Rate and Rhythm: Normal rate and regular rhythm.      Heart sounds: Normal heart sounds. No murmur heard.     No friction rub. No gallop.   Pulmonary:      Effort: Pulmonary effort is normal. No respiratory distress.      Breath sounds: Normal breath sounds. No wheezing or rales.   Neurological:      Mental Status: She is alert and oriented to person, place, and time.   Psychiatric:         Attention and Perception: She is inattentive.         Mood and Affect: Mood is anxious.         Speech: Speech is rapid and pressured and tangential.         Behavior: Behavior is hyperactive.         Thought Content: Thought content is paranoid and delusional. Thought content does not include homicidal or suicidal ideation.           MEDICAL DECISION MAKIN)  Number and Complexity of Problems  Problem List This Visit:  Hyperactivity, delusions, paranoia, rapid speech    Differential Diagnosis:  Acute page, psychosis    Diagnoses Considered but Do Not Suspect:  None    Pertinent Comorbid Conditions:  Schizophrenia, depression, anxiety    2)  Data Reviewed  Decision Rules/Scores/MIPS utilized:  None    EKG Interpretation:  None    External Documents Reviewed:  None    Imaging that is independently reviewed and interpreted by me as:  None    See more data below for the lab and radiology tests and orders.    3)  Treatment and Disposition      \"ED Course\" Notes From Epic Narrator:  ED Course as of 24 0005   Tue Aug 06, 2024   2345 Patient is medically cleared for psychiatric evaluation. [JL]   Wed Aug 07, 2024   0004 Patient has been evaluated and will be admitted to [facility] for further psychiatric evaluation and treatment. [JL]      ED Course User Index  [JL]

## 2024-08-21 DIAGNOSIS — F51.01 PRIMARY INSOMNIA: ICD-10-CM

## 2024-08-21 RX ORDER — PHENTERMINE HYDROCHLORIDE 37.5 MG/1
CAPSULE ORAL
COMMUNITY
Start: 2024-08-16

## 2024-08-21 RX ORDER — LIDOCAINE 50 MG/G
PATCH TOPICAL
COMMUNITY
Start: 2024-07-14

## 2024-08-21 NOTE — TELEPHONE ENCOUNTER
[place] : [place] Access     Last Visit: 02/05/2024  Next Appointment: 09/12/2024  Controlled Substance Agreement: N/A  Urine Drug Screen: N/A  Previous Refill Encounter(s): Date:    Disp Refills Start End    zolpidem (AMBIEN CR) 6.25 MG extended release tablet 30 tablet 5 2/5/2024 8/3/2024    Sig - Route: Take 1 tablet by mouth nightly as needed for Sleep for up to 180 days. Max Daily Amount: 6.25 mg - Oral    Sent to pharmacy as: Zolpidem Tartrate ER 6.25 MG Oral Tablet Extended Release (Ambien CR)    E-Prescribing Status: Receipt confirmed by pharmacy (2/5/2024  1:43 PM EST)  Pharmacy    [facility] [id number] - [place], [place] - [address] [facility] - P [phone] - F [phone]  [address] [place] [id number]  Phone: [phone]  Fax: [phone]         Requested Prescriptions     Pending Prescriptions Disp Refills    zolpidem (AMBIEN CR) 6.25 MG extended release tablet 30 tablet 5     Sig: Take 1 tablet by mouth nightly as needed for Sleep for up to 180 days. Max Daily Amount: 6.25 mg

## 2024-08-21 NOTE — TELEPHONE ENCOUNTER
Last appointment: 02/05/2024    Next appointment: 09/12/2024    Patient requesting refill for     ZOLPIDEM (AMBIEN CR) 6.25 MG extended release tablet    Pharmacy   [facility]   [address] [place] [address]

## 2024-08-23 RX ORDER — ZOLPIDEM TARTRATE 6.25 MG/1
6.25 TABLET, FILM COATED, EXTENDED RELEASE ORAL NIGHTLY PRN
Qty: 30 TABLET | Refills: 5 | Status: SHIPPED | OUTPATIENT
Start: 2024-08-23 | End: 2025-02-19

## 2024-09-12 ENCOUNTER — OFFICE VISIT (OUTPATIENT)
Facility: CLINIC | Age: 54
End: 2024-09-12
Payer: COMMERCIAL

## 2024-09-12 VITALS
RESPIRATION RATE: 16 BRPM | OXYGEN SATURATION: 98 % | SYSTOLIC BLOOD PRESSURE: 103 MMHG | DIASTOLIC BLOOD PRESSURE: 61 MMHG | HEIGHT: 65 IN | WEIGHT: 195 LBS | BODY MASS INDEX: 32.49 KG/M2 | TEMPERATURE: 97.3 F | HEART RATE: 73 BPM

## 2024-09-12 DIAGNOSIS — M06.9 RHEUMATOID ARTHRITIS OF OTHER SITE, UNSPECIFIED WHETHER RHEUMATOID FACTOR PRESENT (HCC): ICD-10-CM

## 2024-09-12 DIAGNOSIS — F51.01 PRIMARY INSOMNIA: Primary | ICD-10-CM

## 2024-09-12 DIAGNOSIS — J30.89 SEASONAL ALLERGIC RHINITIS DUE TO OTHER ALLERGIC TRIGGER: ICD-10-CM

## 2024-09-12 PROCEDURE — 3078F DIAST BP <80 MM HG: CPT | Performed by: FAMILY MEDICINE

## 2024-09-12 PROCEDURE — 3074F SYST BP LT 130 MM HG: CPT | Performed by: FAMILY MEDICINE

## 2024-09-12 PROCEDURE — 99214 OFFICE O/P EST MOD 30 MIN: CPT | Performed by: FAMILY MEDICINE

## 2024-09-12 RX ORDER — FLUTICASONE PROPIONATE 50 MCG
2 SPRAY, SUSPENSION (ML) NASAL DAILY
Qty: 16 G | Refills: 3 | Status: SHIPPED | OUTPATIENT
Start: 2024-09-12

## 2024-10-04 RX ORDER — TOPIRAMATE 25 MG/1
25 TABLET, FILM COATED ORAL
Qty: 90 TABLET | Refills: 3 | Status: SHIPPED | OUTPATIENT
Start: 2024-10-04

## 2024-11-01 ENCOUNTER — TRANSCRIBE ORDERS (OUTPATIENT)
Facility: HOSPITAL | Age: 54
End: 2024-11-01

## 2024-11-01 DIAGNOSIS — Z12.31 VISIT FOR SCREENING MAMMOGRAM: Primary | ICD-10-CM

## 2024-11-04 ENCOUNTER — HOSPITAL ENCOUNTER (OUTPATIENT)
Dept: WOMENS IMAGING | Facility: HOSPITAL | Age: 54
Discharge: HOME | End: 2024-11-07
Attending: FAMILY MEDICINE
Payer: COMMERCIAL

## 2024-11-04 DIAGNOSIS — Z12.31 VISIT FOR SCREENING MAMMOGRAM: ICD-10-CM

## 2024-11-04 PROCEDURE — 77063 BREAST TOMOSYNTHESIS BI: CPT

## 2025-02-12 ENCOUNTER — HOSPITAL ENCOUNTER (OUTPATIENT)
Facility: HOSPITAL | Age: 55
Setting detail: SPECIMEN
Discharge: HOME | End: 2025-02-15
Payer: COMMERCIAL

## 2025-02-12 ENCOUNTER — OFFICE VISIT (OUTPATIENT)
Facility: CLINIC | Age: 55
End: 2025-02-12
Payer: COMMERCIAL

## 2025-02-12 VITALS
WEIGHT: 195 LBS | DIASTOLIC BLOOD PRESSURE: 64 MMHG | HEIGHT: 65 IN | TEMPERATURE: 98.6 F | RESPIRATION RATE: 16 BRPM | SYSTOLIC BLOOD PRESSURE: 106 MMHG | BODY MASS INDEX: 32.49 KG/M2 | OXYGEN SATURATION: 99 % | HEART RATE: 69 BPM

## 2025-02-12 DIAGNOSIS — Z01.419 WELL WOMAN EXAM WITH ROUTINE GYNECOLOGICAL EXAM: Primary | ICD-10-CM

## 2025-02-12 DIAGNOSIS — M06.9 RHEUMATOID ARTHRITIS OF OTHER SITE, UNSPECIFIED WHETHER RHEUMATOID FACTOR PRESENT (HCC): ICD-10-CM

## 2025-02-12 DIAGNOSIS — F51.01 PRIMARY INSOMNIA: ICD-10-CM

## 2025-02-12 PROCEDURE — 3074F SYST BP LT 130 MM HG: CPT | Performed by: FAMILY MEDICINE

## 2025-02-12 PROCEDURE — 99396 PREV VISIT EST AGE 40-64: CPT | Performed by: FAMILY MEDICINE

## 2025-02-12 PROCEDURE — 87624 HPV HI-RISK TYP POOLED RSLT: CPT

## 2025-02-12 PROCEDURE — 88175 CYTOPATH C/V AUTO FLUID REDO: CPT

## 2025-02-12 PROCEDURE — 3078F DIAST BP <80 MM HG: CPT | Performed by: FAMILY MEDICINE

## 2025-02-12 RX ORDER — ZOLPIDEM TARTRATE 6.25 MG/1
6.25 TABLET, FILM COATED, EXTENDED RELEASE ORAL NIGHTLY PRN
Qty: 30 TABLET | Refills: 5 | Status: SHIPPED | OUTPATIENT
Start: 2025-02-12 | End: 2025-08-11

## 2025-02-12 SDOH — ECONOMIC STABILITY: FOOD INSECURITY: WITHIN THE PAST 12 MONTHS, THE FOOD YOU BOUGHT JUST DIDN'T LAST AND YOU DIDN'T HAVE MONEY TO GET MORE.: NEVER TRUE

## 2025-02-12 SDOH — ECONOMIC STABILITY: FOOD INSECURITY: WITHIN THE PAST 12 MONTHS, YOU WORRIED THAT YOUR FOOD WOULD RUN OUT BEFORE YOU GOT MONEY TO BUY MORE.: NEVER TRUE

## 2025-02-12 ASSESSMENT — ANXIETY QUESTIONNAIRES
6. BECOMING EASILY ANNOYED OR IRRITABLE: NOT AT ALL
5. BEING SO RESTLESS THAT IT IS HARD TO SIT STILL: NOT AT ALL
4. TROUBLE RELAXING: NOT AT ALL
1. FEELING NERVOUS, ANXIOUS, OR ON EDGE: NOT AT ALL
IF YOU CHECKED OFF ANY PROBLEMS ON THIS QUESTIONNAIRE, HOW DIFFICULT HAVE THESE PROBLEMS MADE IT FOR YOU TO DO YOUR WORK, TAKE CARE OF THINGS AT HOME, OR GET ALONG WITH OTHER PEOPLE: NOT DIFFICULT AT ALL
2. NOT BEING ABLE TO STOP OR CONTROL WORRYING: NOT AT ALL
3. WORRYING TOO MUCH ABOUT DIFFERENT THINGS: NOT AT ALL
GAD7 TOTAL SCORE: 0
7. FEELING AFRAID AS IF SOMETHING AWFUL MIGHT HAPPEN: NOT AT ALL

## 2025-02-12 ASSESSMENT — PATIENT HEALTH QUESTIONNAIRE - PHQ9
1. LITTLE INTEREST OR PLEASURE IN DOING THINGS: NOT AT ALL
SUM OF ALL RESPONSES TO PHQ9 QUESTIONS 1 & 2: 0
SUM OF ALL RESPONSES TO PHQ QUESTIONS 1-9: 0
2. FEELING DOWN, DEPRESSED OR HOPELESS: NOT AT ALL
SUM OF ALL RESPONSES TO PHQ QUESTIONS 1-9: 0

## 2025-02-12 NOTE — PROGRESS NOTES
\"Have you been to the ER, urgent care clinic since your last visit?  Hospitalized since your last visit?\"    NO    “Have you seen or consulted any other health care providers outside of [facility]?”    This include any pap smears or colon screening.  Yes, Rheumatology- Dr. Apple Rogers, Dermatology- [facility], Ophthalmology- Dr. WARNER Scott and Dental- Dr. Flores      “Have you had a pap smear?”    NO    Date of last Cervical Cancer screen (HPV or PAP): 12/12/2019     Click Here for Release of Records Request

## 2025-02-14 LAB — HPV I/H RISK 1 DNA CVX QL PROBE+SIG AMP: NEGATIVE

## 2025-03-31 RX ORDER — METOPROLOL SUCCINATE 50 MG/1
50 TABLET, EXTENDED RELEASE ORAL DAILY
Qty: 90 TABLET | Refills: 3 | Status: SHIPPED | OUTPATIENT
Start: 2025-03-31

## 2025-04-07 NOTE — TELEPHONE ENCOUNTER
Last Visit: 2025   Next Appointment: 2025    Requested Prescriptions     Pending Prescriptions Disp Refills    fluticasone (FLONASE) 50 MCG/ACT nasal spray 16 g 3     Si sprays by Nasal route daily

## 2025-04-11 RX ORDER — FLUTICASONE PROPIONATE 50 MCG
2 SPRAY, SUSPENSION (ML) NASAL DAILY
Qty: 16 G | Refills: 3 | Status: SHIPPED | OUTPATIENT
Start: 2025-04-11

## 2025-04-18 ENCOUNTER — PATIENT MESSAGE (OUTPATIENT)
Facility: CLINIC | Age: 55
End: 2025-04-18

## 2025-04-18 DIAGNOSIS — J30.89 SEASONAL ALLERGIC RHINITIS DUE TO OTHER ALLERGIC TRIGGER: Primary | ICD-10-CM

## 2025-04-22 NOTE — TELEPHONE ENCOUNTER
This request/message has been forwarded to the provider for review, thank you.     Requested Prescriptions     Pending Prescriptions Disp Refills    fluticasone (FLONASE) 50 MCG/ACT nasal spray 16 g 3     Si sprays by Nasal route daily

## 2025-04-24 RX ORDER — FLUTICASONE PROPIONATE 50 MCG
2 SPRAY, SUSPENSION (ML) NASAL DAILY
Qty: 48 G | Refills: 3 | Status: SHIPPED | OUTPATIENT
Start: 2025-04-24

## 2025-08-11 ENCOUNTER — OFFICE VISIT (OUTPATIENT)
Facility: CLINIC | Age: 55
End: 2025-08-11
Payer: COMMERCIAL

## 2025-08-11 VITALS
WEIGHT: 196.2 LBS | SYSTOLIC BLOOD PRESSURE: 113 MMHG | TEMPERATURE: 98.5 F | DIASTOLIC BLOOD PRESSURE: 76 MMHG | RESPIRATION RATE: 18 BRPM | BODY MASS INDEX: 32.69 KG/M2 | OXYGEN SATURATION: 98 % | HEIGHT: 65 IN | HEART RATE: 64 BPM

## 2025-08-11 DIAGNOSIS — I10 ESSENTIAL (PRIMARY) HYPERTENSION: Primary | ICD-10-CM

## 2025-08-11 DIAGNOSIS — F51.01 PRIMARY INSOMNIA: ICD-10-CM

## 2025-08-11 PROCEDURE — 3074F SYST BP LT 130 MM HG: CPT | Performed by: FAMILY MEDICINE

## 2025-08-11 PROCEDURE — 99214 OFFICE O/P EST MOD 30 MIN: CPT | Performed by: FAMILY MEDICINE

## 2025-08-11 PROCEDURE — 3078F DIAST BP <80 MM HG: CPT | Performed by: FAMILY MEDICINE

## 2025-08-11 RX ORDER — ZOLPIDEM TARTRATE 6.25 MG/1
6.25 TABLET, FILM COATED, EXTENDED RELEASE ORAL NIGHTLY PRN
Qty: 30 TABLET | Refills: 5 | Status: SHIPPED | OUTPATIENT
Start: 2025-08-11 | End: 2026-02-07

## 2025-08-11 SDOH — ECONOMIC STABILITY: FOOD INSECURITY: WITHIN THE PAST 12 MONTHS, YOU WORRIED THAT YOUR FOOD WOULD RUN OUT BEFORE YOU GOT MONEY TO BUY MORE.: NEVER TRUE

## 2025-08-11 SDOH — ECONOMIC STABILITY: FOOD INSECURITY: WITHIN THE PAST 12 MONTHS, THE FOOD YOU BOUGHT JUST DIDN'T LAST AND YOU DIDN'T HAVE MONEY TO GET MORE.: NEVER TRUE

## 2025-08-11 ASSESSMENT — PATIENT HEALTH QUESTIONNAIRE - PHQ9: DEPRESSION UNABLE TO ASSESS: PT REFUSES
